# Patient Record
Sex: MALE | Race: WHITE | Employment: OTHER | URBAN - METROPOLITAN AREA
[De-identification: names, ages, dates, MRNs, and addresses within clinical notes are randomized per-mention and may not be internally consistent; named-entity substitution may affect disease eponyms.]

---

## 2017-02-10 ENCOUNTER — TELEPHONE (OUTPATIENT)
Dept: CARDIOLOGY CLINIC | Age: 62
End: 2017-02-10

## 2017-05-23 RX ORDER — METOPROLOL TARTRATE 50 MG/1
TABLET, FILM COATED ORAL
Qty: 180 TABLET | Refills: 0 | Status: SHIPPED | OUTPATIENT
Start: 2017-05-23 | End: 2017-09-15 | Stop reason: SDUPTHER

## 2017-09-15 DIAGNOSIS — E78.2 MIXED HYPERLIPIDEMIA: Primary | Chronic | ICD-10-CM

## 2017-09-15 DIAGNOSIS — I10 ESSENTIAL HYPERTENSION: Chronic | ICD-10-CM

## 2017-09-15 DIAGNOSIS — I25.10 CORONARY ARTERY DISEASE INVOLVING NATIVE HEART WITHOUT ANGINA PECTORIS, UNSPECIFIED VESSEL OR LESION TYPE: Chronic | ICD-10-CM

## 2017-09-15 RX ORDER — ATORVASTATIN CALCIUM 40 MG/1
40 TABLET, FILM COATED ORAL DAILY
Qty: 30 TABLET | Refills: 11 | Status: SHIPPED | OUTPATIENT
Start: 2017-09-15 | End: 2018-07-23 | Stop reason: SDUPTHER

## 2017-09-15 RX ORDER — METOPROLOL TARTRATE 50 MG/1
50 TABLET, FILM COATED ORAL 2 TIMES DAILY
Qty: 180 TABLET | Refills: 0 | Status: SHIPPED | OUTPATIENT
Start: 2017-09-15 | End: 2017-12-13 | Stop reason: SDUPTHER

## 2017-09-15 RX ORDER — AMLODIPINE BESYLATE 5 MG/1
5 TABLET ORAL DAILY
Qty: 90 TABLET | Refills: 3 | Status: SHIPPED | OUTPATIENT
Start: 2017-09-15 | End: 2018-07-23 | Stop reason: SDUPTHER

## 2017-09-15 RX ORDER — FENOFIBRATE 145 MG/1
145 TABLET, COATED ORAL DAILY
Qty: 30 TABLET | Refills: 11 | Status: SHIPPED | OUTPATIENT
Start: 2017-09-15 | End: 2018-07-23 | Stop reason: SDUPTHER

## 2017-11-01 ENCOUNTER — OFFICE VISIT (OUTPATIENT)
Dept: CARDIOLOGY CLINIC | Age: 62
End: 2017-11-01

## 2017-11-01 VITALS
WEIGHT: 193.12 LBS | OXYGEN SATURATION: 96 % | SYSTOLIC BLOOD PRESSURE: 136 MMHG | HEART RATE: 84 BPM | DIASTOLIC BLOOD PRESSURE: 86 MMHG | BODY MASS INDEX: 27.04 KG/M2 | HEIGHT: 71 IN

## 2017-11-01 DIAGNOSIS — E78.2 MIXED HYPERLIPIDEMIA: Chronic | ICD-10-CM

## 2017-11-01 DIAGNOSIS — I71.20 THORACIC AORTIC ANEURYSM WITHOUT RUPTURE: ICD-10-CM

## 2017-11-01 DIAGNOSIS — I10 ESSENTIAL HYPERTENSION: Primary | Chronic | ICD-10-CM

## 2017-11-01 PROCEDURE — 99214 OFFICE O/P EST MOD 30 MIN: CPT | Performed by: INTERNAL MEDICINE

## 2017-11-01 NOTE — COMMUNICATION BODY
[facility]  Cardiac Follow up     Referring Provider:  Mohan Malcolm MD     Chief Complaint   Patient presents with    Coronary Artery Disease    Hyperlipidemia    Hypertension      History of Present Illness:  Mr. Octavia Mtz is here for a follow-up visit on his history of CAD with MI and PCI in 2004, hypertension, and hyperlipidemia. He has not smoked since his heart attack. Today,  Jason Erazo denies any chest pain, palpitations, LO, dizziness, or edema. Recently vacationed in [facility], with grandchildren. He quit taking crestor 5 weeks ago, due to cost. It was $600/3 months. Would like to switch to lipitor. Admits to drinking a 6 pack a day      Past Medical History: has a past medical history of CAD (coronary artery disease); Equilibrium disorder; Hyperlipidemia; Hypertension; and Old MI (myocardial infarction). Surgical History: has a past surgical history that includes Coronary angioplasty with stent and Cardiac catheterization. Social History: reports that he has quit smoking. His smoking use included Cigarettes. He has never used smokeless tobacco. He reports that he drinks alcohol. He reports that he does not use drugs. Family History:family history includes Heart Disease in his brother, brother, father, mother, and paternal uncle. Home Medications:  Outpatient Encounter Prescriptions as of 11/1/2017   Medication Sig Dispense Refill    metoprolol tartrate (LOPRESSOR) 50 MG tablet Take 1 tablet by mouth 2 times daily 180 tablet 0    amLODIPine (NORVASC) 5 MG tablet Take 1 tablet by mouth daily 90 tablet 3    atorvastatin (LIPITOR) 40 MG tablet Take 1 tablet by mouth daily 30 tablet 11    fenofibrate (TRICOR) 145 MG tablet Take 1 tablet by mouth daily 30 tablet 11    aspirin 81 MG EC tablet Take 81 mg by mouth daily. No facility-administered encounter medications on file as of 11/1/2017. Allergies:Review of patient's allergies indicates no known allergies.      Review of 1. CAD: Stable, no anginal symptoms. 2003 cath> s/p Cypher stent to mid LAD.    2004 cath due to positive stress test> patent stent and no other disease noted. 6/2011 Myoview GXT showed normal perfusion with EF 71%. 2. Hyperlipidemia: labs 4/4/16: ,  -patient stopped Crestor. Lipids 5/3/16: ; TRIG 110; HDL 63; LDL 61; AST 23; ALT 23. Restart crestor at 20mg  . 7/16  Triglycerides 669  Will stop crestor and switch to lipitor 40 mg daily due to cost, start tricor 145mg daily  Lipid liver panel in 6 weeks and 3 months   3. Hypertension: Blood pressure 136/86, pulse 84, height 5' 11\" (1.803 m), weight 193 lb 1.9 oz (87.6 kg), SpO2 96 %. controlled         Plan:  Stable cardiac status  1. Change crestor to lipitor 40 mg daily  2. Lipid liver panel in 6 weeks and 3 months  3. Follow up in 4 months  4.   Recommend decrease beer intake to no more than 2 /day    Thank you for allowing me to participate in the care of this individual.    Arlin Mayfield M.D., [facility]

## 2017-11-01 NOTE — LETTER
 atorvastatin (LIPITOR) 40 MG tablet Take 1 tablet by mouth daily 30 tablet 11    fenofibrate (TRICOR) 145 MG tablet Take 1 tablet by mouth daily 30 tablet 11    aspirin 81 MG EC tablet Take 81 mg by mouth daily. No facility-administered encounter medications on file as of 11/1/2017. Allergies:Review of patient's allergies indicates no known allergies. Review of Systems:   · Constitutional: there has been no unanticipated weight loss. There's been no change in energy level, sleep pattern, or activity level. · Eyes: No visual changes or diplopia. No scleral icterus. · ENT: No Headaches, hearing loss or vertigo. No mouth sores or sore throat. · Cardiovascular: Reviewed in HPI  · Respiratory: No cough or wheezing, no sputum production. No hematemesis. · Gastrointestinal: No abdominal pain, appetite loss, blood in stools. No change in bowel or bladder habits. · Genitourinary: No dysuria, trouble voiding, or hematuria. · Musculoskeletal:  No gait disturbance, weakness or joint complaints. · Integumentary: No rash or pruritis. · Neurological: No headache, diplopia, change in muscle strength, numbness or tingling. No change in gait, balance, coordination, mood, affect, memory, mentation, behavior. · Psychiatric: No anxiety, no depression. · Endocrine: No malaise, fatigue or temperature intolerance. No excessive thirst, fluid intake, or urination. No tremor. · Hematologic/Lymphatic: No abnormal bruising or bleeding, blood clots or swollen lymph nodes. · Allergic/Immunologic: No nasal congestion or hives.     Physical Examination:    Vitals:    11/01/17 1459   BP: 136/86   Pulse: 84   SpO2: 96%     GENERAL: Well developed, well nourished, No acute distress  · NEUROLOGICAL: Alert and oriented  · PSYCH: Calm affect  · SKIN: Warm and dry  · HEENT: Normocephalic, Sclera non-icteric, mucus membranes moist  · NECK: supple, JVP normal  · CAROTID: Normal upstroke, no bruits · CARDIAC: Normal PMI, regular rate and rhythm, normal S1S2, no murmur, rub, or gallop  · RESPIRATORY: Normal respiratory effort, clear to auscultation bilaterally  · EXTREMITIES: No edema  · MUSCULOSKELETAL: No joint swelling or tenderness, no chest wall tenderness  · GASTROINTESTINAL: normal bowel sounds, soft, non-tender, no bruit    US of GB--  1. HEPATIC STEATOSIS  2. GALLBLADDER SLUDGE  Assessment:   1. CAD: Stable, no anginal symptoms. 2003 cath> s/p Cypher stent to mid LAD.    2004 cath due to positive stress test> patent stent and no other disease noted. 6/2011 Myoview GXT showed normal perfusion with EF 71%. 2. Hyperlipidemia: labs 4/4/16: ,  -patient stopped Crestor. Lipids 5/3/16: ; TRIG 110; HDL 63; LDL 61; AST 23; ALT 23. Restart crestor at 20mg  . 7/16  Triglycerides 669  Will stop crestor and switch to lipitor 40 mg daily due to cost, start tricor 145mg daily  Lipid liver panel in 6 weeks and 3 months   3. Hypertension: Blood pressure 136/86, pulse 84, height 5' 11\" (1.803 m), weight 193 lb 1.9 oz (87.6 kg), SpO2 96 %. controlled         Plan:  Stable cardiac status  1. Change crestor to lipitor 40 mg daily  2. Lipid liver panel in 6 weeks and 3 months  3. Follow up in 4 months  4. Recommend decrease beer intake to no more than 2 /day    Thank you for allowing me to participate in the care of this individual.    Chiquita Rojo M.D., [facility] [place]          If you have questions, please do not hesitate to call me. I look forward to following Eusebia Leyva along with you.     Sincerely,        Pastor Cathryn MD

## 2017-11-01 NOTE — PATIENT INSTRUCTIONS
Same medications  Fasting labs soon bmp, liver and lipid. Stay active. He walks about 10 miles at work daily. Follow up in 1 year.

## 2017-11-05 LAB
A/G RATIO: 1.6 (CALC) (ref 1–2.5)
ALBUMIN SERPL-MCNC: 4.5 G/DL (ref 3.6–5.1)
ALP BLD-CCNC: 51 U/L (ref 40–115)
ALT SERPL-CCNC: 25 U/L (ref 9–46)
AST SERPL-CCNC: 44 U/L (ref 10–35)
ATYPICAL LYMPHOCYTE RELATIVE PERCENT: ABNORMAL % (ref 0–10)
BAND NEUTROPHILS: ABNORMAL %
BANDED NEUTROPHILS ABSOLUTE COUNT: ABNORMAL CELLS/UL (ref 0–750)
BASOPHILS ABSOLUTE: 53 CELLS/UL (ref 0–200)
BASOPHILS RELATIVE PERCENT: 0.7 %
BILIRUB SERPL-MCNC: 0.9 MG/DL (ref 0.2–1.2)
BILIRUBIN DIRECT: 0.2 MG/DL
BILIRUBIN, INDIRECT: 0.7 MG/DL (CALC) (ref 0.2–1.2)
BLASTS ABSOLUTE COUNT: ABNORMAL CELLS/UL
BLASTS RELATIVE PERCENT: ABNORMAL %
BUN / CREAT RATIO: ABNORMAL (CALC) (ref 6–22)
BUN BLDV-MCNC: 15 MG/DL (ref 7–25)
CALCIUM SERPL-MCNC: 9.6 MG/DL (ref 8.6–10.3)
CHLORIDE BLD-SCNC: 103 MMOL/L (ref 98–110)
CHOLESTEROL, TOTAL: 162 MG/DL
CHOLESTEROL/HDL RATIO: 2.3 (CALC)
CHOLESTEROL: 91 MG/DL (CALC)
CO2: 24 MMOL/L (ref 20–31)
COMMENT: ABNORMAL
CREAT SERPL-MCNC: 1 MG/DL (ref 0.7–1.25)
EOSINOPHILS ABSOLUTE: 114 CELLS/UL (ref 15–500)
EOSINOPHILS RELATIVE PERCENT: 1.5 %
GFR AFRICAN AMERICAN: 92 ML/MIN/1.73M2
GFR SERPL CREATININE-BSD FRML MDRD: 79 ML/MIN/1.73M2
GLOBULIN: 2.8 G/DL (CALC) (ref 1.9–3.7)
GLUCOSE BLD-MCNC: 102 MG/DL (ref 65–99)
HCT VFR BLD CALC: 46.3 % (ref 38.5–50)
HDLC SERPL-MCNC: 71 MG/DL
HEMOGLOBIN: 16.1 G/DL (ref 13.2–17.1)
LDL CHOLESTEROL CALCULATED: 68 MG/DL (CALC)
LYMPHOCYTES ABSOLUTE: 1148 CELLS/UL (ref 850–3900)
LYMPHOCYTES RELATIVE PERCENT: 15.1 %
MCH RBC QN AUTO: 33.8 PG (ref 27–33)
MCHC RBC AUTO-ENTMCNC: 34.8 G/DL (ref 32–36)
MCV RBC AUTO: 97.3 FL (ref 80–100)
METAMYELOCYTES ABSOLUTE COUNT: ABNORMAL CELLS/UL
METAMYELOCYTES RELATIVE PERCENT: ABNORMAL %
MONOCYTES ABSOLUTE: 722 CELLS/UL (ref 200–950)
MONOCYTES RELATIVE PERCENT: 9.5 %
MYELOCYTE PERCENT: ABNORMAL %
MYELOCYTES ABSOLUTE COUNT: ABNORMAL CELLS/UL
NEUTROPHILS ABSOLUTE: 5563 CELLS/UL (ref 1500–7800)
NUCLEATED RED BLOOD CELLS: ABNORMAL /100 WBC
NUCLEATED RED BLOOD CELLS: ABNORMAL CELLS/UL
PDW BLD-RTO: 12.7 % (ref 11–15)
PLATELET # BLD: 264 THOUSAND/UL (ref 140–400)
PMV BLD AUTO: 10.5 FL (ref 7.5–12.5)
POTASSIUM SERPL-SCNC: 4.4 MMOL/L (ref 3.5–5.3)
PROMYELOCYTES ABSOLUTE COUNT: ABNORMAL CELLS/UL
PROMYELOCYTES PERCENT: ABNORMAL %
RBC # BLD: 4.76 MILLION/UL (ref 4.2–5.8)
SEGMENTED NEUTROPHILS RELATIVE PERCENT: 73.2 %
SODIUM BLD-SCNC: 140 MMOL/L (ref 135–146)
TOTAL PROTEIN: 7.3 G/DL (ref 6.1–8.1)
TRIGL SERPL-MCNC: 142 MG/DL
WBC # BLD: 7.6 THOUSAND/UL (ref 3.8–10.8)

## 2017-11-06 ENCOUNTER — TELEPHONE (OUTPATIENT)
Dept: CARDIOLOGY CLINIC | Age: 62
End: 2017-11-06

## 2017-11-06 NOTE — TELEPHONE ENCOUNTER
Notes Recorded by Tyrese Hutchison RN on 11/6/2017 at 9:40 AM EST (CBC, Lipids, Hepatic, BMP)  Notify pt labs ok.  Follow up as scheduled        Patient informed

## 2017-12-13 DIAGNOSIS — E78.2 MIXED HYPERLIPIDEMIA: Chronic | ICD-10-CM

## 2017-12-13 DIAGNOSIS — I10 ESSENTIAL HYPERTENSION: Chronic | ICD-10-CM

## 2017-12-13 DIAGNOSIS — I25.10 CORONARY ARTERY DISEASE INVOLVING NATIVE HEART WITHOUT ANGINA PECTORIS, UNSPECIFIED VESSEL OR LESION TYPE: Chronic | ICD-10-CM

## 2017-12-14 RX ORDER — METOPROLOL TARTRATE 50 MG/1
TABLET, FILM COATED ORAL
Qty: 180 TABLET | Refills: 0 | Status: SHIPPED | OUTPATIENT
Start: 2017-12-14 | End: 2018-03-05 | Stop reason: SDUPTHER

## 2018-03-05 DIAGNOSIS — I10 ESSENTIAL HYPERTENSION: Chronic | ICD-10-CM

## 2018-03-05 DIAGNOSIS — I25.10 CORONARY ARTERY DISEASE INVOLVING NATIVE HEART WITHOUT ANGINA PECTORIS, UNSPECIFIED VESSEL OR LESION TYPE: Chronic | ICD-10-CM

## 2018-03-05 DIAGNOSIS — E78.2 MIXED HYPERLIPIDEMIA: Chronic | ICD-10-CM

## 2018-03-05 NOTE — TELEPHONE ENCOUNTER
Medication Refill    When was your last appointment with cardiology? Scheduled 4/27/18 [facility]  Last in on 11/1/17  (if 1year or longer, please schedule an appointment)    Medication needing refilled:metoprolol tartrate (LOPRESSOR) 50 MG tablet    Doseage of the medication: 50 mg    How are you taking this medication (QD, BID, TID, QID, PRN):TAKE 1 TABLET BY MOUTH TWO TIMES A DAY    Patient want a 30 or 90 day supply called in:90    Which Pharmacy are we sending the medication to: [address], [address] [facility]    Pharmacy Phone number:    Pharmacy Fax number:

## 2018-03-06 RX ORDER — METOPROLOL TARTRATE 50 MG/1
TABLET, FILM COATED ORAL
Qty: 180 TABLET | Refills: 1 | Status: SHIPPED | OUTPATIENT
Start: 2018-03-06 | End: 2018-07-23 | Stop reason: SDUPTHER

## 2018-07-23 ENCOUNTER — OFFICE VISIT (OUTPATIENT)
Dept: CARDIOLOGY CLINIC | Age: 63
End: 2018-07-23

## 2018-07-23 VITALS
DIASTOLIC BLOOD PRESSURE: 72 MMHG | HEART RATE: 76 BPM | BODY MASS INDEX: 25.9 KG/M2 | SYSTOLIC BLOOD PRESSURE: 126 MMHG | WEIGHT: 185 LBS | HEIGHT: 71 IN

## 2018-07-23 DIAGNOSIS — I10 ESSENTIAL HYPERTENSION: Chronic | ICD-10-CM

## 2018-07-23 DIAGNOSIS — I25.10 CORONARY ARTERY DISEASE INVOLVING NATIVE HEART WITHOUT ANGINA PECTORIS, UNSPECIFIED VESSEL OR LESION TYPE: Primary | Chronic | ICD-10-CM

## 2018-07-23 DIAGNOSIS — E78.2 MIXED HYPERLIPIDEMIA: Chronic | ICD-10-CM

## 2018-07-23 PROCEDURE — 99214 OFFICE O/P EST MOD 30 MIN: CPT | Performed by: INTERNAL MEDICINE

## 2018-07-23 RX ORDER — ATORVASTATIN CALCIUM 40 MG/1
40 TABLET, FILM COATED ORAL DAILY
Qty: 30 TABLET | Refills: 11 | Status: SHIPPED | OUTPATIENT
Start: 2018-07-23 | End: 2019-07-29 | Stop reason: SDUPTHER

## 2018-07-23 RX ORDER — FENOFIBRATE 145 MG/1
145 TABLET, COATED ORAL DAILY
Qty: 30 TABLET | Refills: 11 | Status: SHIPPED | OUTPATIENT
Start: 2018-07-23 | End: 2019-07-29 | Stop reason: SDUPTHER

## 2018-07-23 RX ORDER — METOPROLOL TARTRATE 50 MG/1
TABLET, FILM COATED ORAL
Qty: 60 TABLET | Refills: 11 | Status: SHIPPED | OUTPATIENT
Start: 2018-07-23 | End: 2019-07-29 | Stop reason: SDUPTHER

## 2018-07-23 RX ORDER — AMLODIPINE BESYLATE 5 MG/1
5 TABLET ORAL DAILY
Qty: 30 TABLET | Refills: 11 | Status: SHIPPED | OUTPATIENT
Start: 2018-07-23 | End: 2019-07-29 | Stop reason: SDUPTHER

## 2018-07-23 NOTE — LETTER
atorvastatin (LIPITOR) 40 MG tablet Take 1 tablet by mouth daily Yes Otf Gaming MD   fenofibrate (TRICOR) 145 MG tablet Take 1 tablet by mouth daily Yes Otf Gaming MD   aspirin 81 MG EC tablet Take 81 mg by mouth daily. Yes Historical Provider, MD       Allergies:Patient has no known allergies. [x] Medications and dosages reviewed. ROS:  [x]Full ROS obtained and negative except as mentioned in HPI      Physical Examination:    Vitals:    07/23/18 0749   BP: 126/72   Pulse: 76   /72 (Site: Left Arm, Position: Sitting, Cuff Size: Medium Adult)   Pulse 76   Ht 5' 11\" (1.803 m)   Wt 185 lb (83.9 kg)   BMI 25.80 kg/m²       GENERAL: Well developed, well nourished, No acute distress  · NEUROLOGICAL: Alert and oriented  · PSYCH: Calm affect  · SKIN: Warm and dry  · HEENT: Normocephalic, Sclera non-icteric, mucus membranes moist  · NECK: supple, JVP normal  · CAROTID: Normal upstroke, no bruits  · CARDIAC: Normal PMI, regular rate and rhythm, normal S1S2, no murmur, rub, or gallop  · RESPIRATORY: Normal respiratory effort, clear to auscultation bilaterally  · EXTREMITIES: No edema  · MUSCULOSKELETAL: No joint swelling or tenderness, no chest wall tenderness  · GASTROINTESTINAL: normal bowel sounds, soft, non-tender, no bruit      Assessment:   1. CAD:   Remains active and stable without angina. 2003 cath> s/p Cypher stent to mid LAD.    2004 cath due to positive stress test> patent stent and no other disease noted. 6/2011 Myoview GXT showed normal perfusion with EF 71%. 2. Hyperlipidemia: LDL=68, 11/2017-controlled on lipitor   3. Hypertension: Blood pressure 126/72, pulse 76, height 5' 11\" (1.803 m), weight 185 lb (83.9 kg). Well controlled       Plan:  Stable cardiac status  Recheck lipids  F/u 1 year    Thank you for allowing me to participate in the care of this individual.    Makayla Adan M.D., [facility] [place]           If you have questions, please do not hesitate to call me.  I look forward to following Lenon Cheese along with you.     Sincerely,        Bonifacio Keith MD

## 2018-07-23 NOTE — PROGRESS NOTES
Vitals:    07/23/18 0749   BP: 126/72   Pulse: 76   /72 (Site: Left Arm, Position: Sitting, Cuff Size: Medium Adult)   Pulse 76   Ht 5' 11\" (1.803 m)   Wt 185 lb (83.9 kg)   BMI 25.80 kg/m²       GENERAL: Well developed, well nourished, No acute distress  · NEUROLOGICAL: Alert and oriented  · PSYCH: Calm affect  · SKIN: Warm and dry  · HEENT: Normocephalic, Sclera non-icteric, mucus membranes moist  · NECK: supple, JVP normal  · CAROTID: Normal upstroke, no bruits  · CARDIAC: Normal PMI, regular rate and rhythm, normal S1S2, no murmur, rub, or gallop  · RESPIRATORY: Normal respiratory effort, clear to auscultation bilaterally  · EXTREMITIES: No edema  · MUSCULOSKELETAL: No joint swelling or tenderness, no chest wall tenderness  · GASTROINTESTINAL: normal bowel sounds, soft, non-tender, no bruit      Assessment:   1. CAD:   Remains active and stable without angina. 2003 cath> s/p Cypher stent to mid LAD.    2004 cath due to positive stress test> patent stent and no other disease noted. 6/2011 Myoview GXT showed normal perfusion with EF 71%. 2. Hyperlipidemia: LDL=68, 11/2017-controlled on lipitor   3. Hypertension: Blood pressure 126/72, pulse 76, height 5' 11\" (1.803 m), weight 185 lb (83.9 kg).  Well controlled       Plan:  Stable cardiac status  Recheck lipids  F/u 1 year    Thank you for allowing me to participate in the care of this individual.    Dora Harvey M.D., [facility]

## 2018-07-24 NOTE — COMMUNICATION BODY
Vitals:    07/23/18 0749   BP: 126/72   Pulse: 76   /72 (Site: Left Arm, Position: Sitting, Cuff Size: Medium Adult)   Pulse 76   Ht 5' 11\" (1.803 m)   Wt 185 lb (83.9 kg)   BMI 25.80 kg/m²       GENERAL: Well developed, well nourished, No acute distress  · NEUROLOGICAL: Alert and oriented  · PSYCH: Calm affect  · SKIN: Warm and dry  · HEENT: Normocephalic, Sclera non-icteric, mucus membranes moist  · NECK: supple, JVP normal  · CAROTID: Normal upstroke, no bruits  · CARDIAC: Normal PMI, regular rate and rhythm, normal S1S2, no murmur, rub, or gallop  · RESPIRATORY: Normal respiratory effort, clear to auscultation bilaterally  · EXTREMITIES: No edema  · MUSCULOSKELETAL: No joint swelling or tenderness, no chest wall tenderness  · GASTROINTESTINAL: normal bowel sounds, soft, non-tender, no bruit      Assessment:   1. CAD:   Remains active and stable without angina. 2003 cath> s/p Cypher stent to mid LAD.    2004 cath due to positive stress test> patent stent and no other disease noted. 6/2011 Myoview GXT showed normal perfusion with EF 71%. 2. Hyperlipidemia: LDL=68, 11/2017-controlled on lipitor   3. Hypertension: Blood pressure 126/72, pulse 76, height 5' 11\" (1.803 m), weight 185 lb (83.9 kg).  Well controlled       Plan:  Stable cardiac status  Recheck lipids  F/u 1 year    Thank you for allowing me to participate in the care of this individual.    Annika Myers M.D., [facility]

## 2019-07-29 ENCOUNTER — OFFICE VISIT (OUTPATIENT)
Dept: CARDIOLOGY CLINIC | Age: 64
End: 2019-07-29
Payer: MEDICARE

## 2019-07-29 VITALS
SYSTOLIC BLOOD PRESSURE: 130 MMHG | WEIGHT: 187.8 LBS | DIASTOLIC BLOOD PRESSURE: 76 MMHG | BODY MASS INDEX: 25.44 KG/M2 | HEIGHT: 72 IN | HEART RATE: 64 BPM

## 2019-07-29 DIAGNOSIS — I25.10 CORONARY ARTERY DISEASE INVOLVING NATIVE HEART WITHOUT ANGINA PECTORIS, UNSPECIFIED VESSEL OR LESION TYPE: Primary | ICD-10-CM

## 2019-07-29 DIAGNOSIS — E78.2 MIXED HYPERLIPIDEMIA: ICD-10-CM

## 2019-07-29 DIAGNOSIS — I10 ESSENTIAL HYPERTENSION: ICD-10-CM

## 2019-07-29 PROCEDURE — 99214 OFFICE O/P EST MOD 30 MIN: CPT | Performed by: INTERNAL MEDICINE

## 2019-07-29 RX ORDER — AMLODIPINE BESYLATE 5 MG/1
5 TABLET ORAL DAILY
Qty: 30 TABLET | Refills: 11 | Status: SHIPPED | OUTPATIENT
Start: 2019-07-29 | End: 2020-07-29

## 2019-07-29 RX ORDER — METOPROLOL TARTRATE 50 MG/1
TABLET, FILM COATED ORAL
Qty: 60 TABLET | Refills: 11 | Status: SHIPPED | OUTPATIENT
Start: 2019-07-29 | End: 2020-07-29

## 2019-07-29 RX ORDER — FENOFIBRATE 145 MG/1
145 TABLET, COATED ORAL DAILY
Qty: 30 TABLET | Refills: 11 | Status: SHIPPED | OUTPATIENT
Start: 2019-07-29 | End: 2020-07-29

## 2019-07-29 RX ORDER — CITALOPRAM 10 MG/1
10 TABLET ORAL DAILY
COMMUNITY
Start: 2019-05-10 | End: 2021-11-02

## 2019-07-29 RX ORDER — ATORVASTATIN CALCIUM 40 MG/1
40 TABLET, FILM COATED ORAL DAILY
Qty: 30 TABLET | Refills: 11 | Status: SHIPPED | OUTPATIENT
Start: 2019-07-29 | End: 2020-07-29

## 2019-07-29 NOTE — LETTER
[address]  [address]  Phone: [phone]  Fax: [phone]    Jose Fuentes MD        July 29, 2019     [facility] [id number]  [place] [address]    Patient: Petra Raphael  MR Number: [id number]  YOB: 1955  Date of Visit: 7/29/2019    Dear Dr. Jeanine Patiño:    Below are the relevant portions of my assessment and plan of care. [address]  Cardiac Follow up     Referring Provider:  Alexus Zavaleta MD     Chief Complaint   Patient presents with    1 Year Follow Up     No cardiac comp    Coronary Artery Disease    Hypertension    Hyperlipidemia      History of Present Illness:  Mr. Anuradha Aranda is here for a follow-up visit on his history of CAD with MI and PCI in 2004, hypertension, and hyperlipidemia. He has not smoked since his heart attack. He is doing well. Remains active. No chest pain, tightness or pressure. No side effects from meds. [place]. Past Medical History: has a past medical history of CAD (coronary artery disease), Equilibrium disorder, Hyperlipidemia, Hypertension, and Old MI (myocardial infarction). Surgical History: has a past surgical history that includes Coronary angioplasty with stent and Cardiac catheterization. Social History: reports that he has quit smoking. His smoking use included cigarettes. He has never used smokeless tobacco. He reports that he drinks alcohol. He reports that he does not use drugs. Family History:family history includes Heart Disease in his brother, brother, father, mother, and paternal uncle. Home Medications:  Prior to Visit Medications    Medication Sig Taking?  Authorizing Provider   citalopram (CELEXA) 10 MG tablet Take 10 mg by mouth daily  Yes Historical Provider, MD   metoprolol tartrate (LOPRESSOR) 50 MG tablet TAKE 1 TABLET BY MOUTH TWO TIMES A DAY Yes Jose Fuentes MD amLODIPine (NORVASC) 5 MG tablet Take 1 tablet by mouth daily Yes Abbi Cruz MD   atorvastatin (LIPITOR) 40 MG tablet Take 1 tablet by mouth daily Yes Abbi Cruz MD   fenofibrate (TRICOR) 145 MG tablet Take 1 tablet by mouth daily Yes Abbi Cruz MD   aspirin 81 MG EC tablet Take 81 mg by mouth daily. Yes Historical Provider, MD       Allergies:Patient has no known allergies. [x] Medications and dosages reviewed. ROS:  [x]Full ROS obtained and negative except as mentioned in HPI      Physical Examination:    Vitals:    07/29/19 0915   BP: 130/76   Pulse: 64   /76   Pulse 64   Ht 5' 11.5\" (1.816 m)   Wt 187 lb 12.8 oz (85.2 kg)   BMI 25.83 kg/m²       GENERAL: Well developed, well nourished, No acute distress  · NEUROLOGICAL: Alert and oriented  · PSYCH: Calm affect  · SKIN: Warm and dry, no rash  · HEENT: Normocephalic, Sclera non-icteric, mucus membranes moist  · NECK: supple, JVP normal  · CAROTID: Normal upstroke, no bruits  · CARDIAC: Normal PMI, regular rate and rhythm, normal S1S2, no murmur, rub, or gallop  · RESPIRATORY: Normal respiratory effort, clear to auscultation bilaterally  · EXTREMITIES: No LE edema  · MUSCULOSKELETAL: No joint swelling or tenderness, no chest wall tenderness  · GASTROINTESTINAL: normal bowel sounds, soft, non-tender, no bruit      Assessment:   1. CAD:   Remains active and stable without angina. Continue medical management  2003 cath> s/p Cypher stent to mid LAD.    2004 cath due to positive stress test> patent stent and no other disease noted. 6/2011 Myoview GXT showed normal perfusion with EF 71%. 2. Hyperlipidemia: LDL=68, 11/2017-controlled on lipitor in past. Needs labs. Planning on having Saturday   3. Hypertension: Blood pressure 130/76, pulse 64, height 5' 11.5\" (1.816 m), weight 187 lb 12.8 oz (85.2 kg). Well controlled.  Same meds       Plan:  Stable cardiac status  Labs Saturday  F/u 1 year  Recheck lipids  F/u 1 year

## 2020-07-29 RX ORDER — ATORVASTATIN CALCIUM 40 MG/1
TABLET, FILM COATED ORAL
Qty: 30 TABLET | Refills: 0 | Status: SHIPPED | OUTPATIENT
Start: 2020-07-29 | End: 2020-08-31

## 2020-07-29 RX ORDER — FENOFIBRATE 145 MG/1
TABLET, COATED ORAL
Qty: 30 TABLET | Refills: 0 | Status: SHIPPED | OUTPATIENT
Start: 2020-07-29 | End: 2020-08-31

## 2020-07-29 RX ORDER — AMLODIPINE BESYLATE 5 MG/1
TABLET ORAL
Qty: 30 TABLET | Refills: 0 | Status: SHIPPED | OUTPATIENT
Start: 2020-07-29 | End: 2020-08-31

## 2020-07-29 RX ORDER — METOPROLOL TARTRATE 50 MG/1
TABLET, FILM COATED ORAL
Qty: 60 TABLET | Refills: 0 | Status: SHIPPED | OUTPATIENT
Start: 2020-07-29 | End: 2020-08-31

## 2020-07-29 NOTE — TELEPHONE ENCOUNTER
RX APPROVAL:      Refill:   Requested Prescriptions     Pending Prescriptions Disp Refills    fenofibrate (TRICOR) 145 MG tablet [Pharmacy Med Name: Fenofibrate Oral Tablet 145 MG] 30 tablet 0     Sig: TAKE 1 TABLET BY MOUTH ONE TIME A DAY    atorvastatin (LIPITOR) 40 MG tablet [Pharmacy Med Name: Atorvastatin Calcium Oral Tablet 40 MG] 30 tablet 0     Sig: TAKE 1 TABLET BY MOUTH ONE TIME A DAY    metoprolol tartrate (LOPRESSOR) 50 MG tablet [Pharmacy Med Name: Metoprolol Tartrate Oral Tablet 50 MG] 60 tablet 0     Sig: TAKE 1 TABLET BY MOUTH TWO TIMES A DAY    amLODIPine (NORVASC) 5 MG tablet [Pharmacy Med Name: amLODIPine Besylate Oral Tablet 5 MG] 30 tablet 0     Sig: TAKE 1 TABLET BY MOUTH ONE TIME A DAY      Last OV: 7/29/2019 next ov 8/4/20  Last EKG:    Last Labs:  Lab Results   Component Value Date    CHOL 162 11/04/2017    CHOL 91 11/04/2017    TRIG 142 11/04/2017    HDL 71 11/04/2017    LDLCALC 68 11/04/2017     Lab Results   Component Value Date    ALT 25 11/04/2017    AST 44 11/04/2017       Plan and labs reviewed

## 2020-08-04 ENCOUNTER — HOSPITAL ENCOUNTER (OUTPATIENT)
Age: 65
Discharge: HOME | End: 2020-08-04
Payer: MEDICARE

## 2020-08-04 ENCOUNTER — OFFICE VISIT (OUTPATIENT)
Dept: CARDIOLOGY CLINIC | Age: 65
End: 2020-08-04
Payer: MEDICARE

## 2020-08-04 VITALS
WEIGHT: 218.2 LBS | HEART RATE: 72 BPM | HEIGHT: 72 IN | SYSTOLIC BLOOD PRESSURE: 138 MMHG | DIASTOLIC BLOOD PRESSURE: 80 MMHG | BODY MASS INDEX: 29.55 KG/M2

## 2020-08-04 LAB
A/G RATIO: 1.4 (ref 1.1–2.2)
ALBUMIN SERPL-MCNC: 4.3 G/DL (ref 3.4–5)
ALP BLD-CCNC: 45 U/L (ref 40–129)
ALT SERPL-CCNC: 32 U/L (ref 10–40)
ANION GAP SERPL CALCULATED.3IONS-SCNC: 13 MMOL/L (ref 3–16)
AST SERPL-CCNC: 33 U/L (ref 15–37)
BASOPHILS ABSOLUTE: 0.3 K/UL (ref 0–0.2)
BASOPHILS RELATIVE PERCENT: 3.8 %
BILIRUB SERPL-MCNC: 0.5 MG/DL (ref 0–1)
BUN BLDV-MCNC: 8 MG/DL (ref 7–20)
CALCIUM SERPL-MCNC: 9.7 MG/DL (ref 8.3–10.6)
CHLORIDE BLD-SCNC: 102 MMOL/L (ref 99–110)
CHOLESTEROL, TOTAL: 171 MG/DL (ref 0–199)
CO2: 26 MMOL/L (ref 21–32)
CREAT SERPL-MCNC: 0.7 MG/DL (ref 0.8–1.3)
EOSINOPHILS ABSOLUTE: 0.2 K/UL (ref 0–0.6)
EOSINOPHILS RELATIVE PERCENT: 2.8 %
GFR AFRICAN AMERICAN: >60
GFR NON-AFRICAN AMERICAN: >60
GLOBULIN: 3 G/DL
GLUCOSE BLD-MCNC: 114 MG/DL (ref 70–99)
HCT VFR BLD CALC: 48.7 % (ref 40.5–52.5)
HDLC SERPL-MCNC: 45 MG/DL (ref 40–60)
HEMOGLOBIN: 16.3 G/DL (ref 13.5–17.5)
LDL CHOLESTEROL CALCULATED: 100 MG/DL
LYMPHOCYTES ABSOLUTE: 1.4 K/UL (ref 1–5.1)
LYMPHOCYTES RELATIVE PERCENT: 19.3 %
MCH RBC QN AUTO: 34.1 PG (ref 26–34)
MCHC RBC AUTO-ENTMCNC: 33.5 G/DL (ref 31–36)
MCV RBC AUTO: 101.7 FL (ref 80–100)
MONOCYTES ABSOLUTE: 0.6 K/UL (ref 0–1.3)
MONOCYTES RELATIVE PERCENT: 7.7 %
NEUTROPHILS ABSOLUTE: 4.9 K/UL (ref 1.7–7.7)
NEUTROPHILS RELATIVE PERCENT: 66.4 %
PDW BLD-RTO: 14.2 % (ref 12.4–15.4)
PLATELET # BLD: 352 K/UL (ref 135–450)
PMV BLD AUTO: 9.1 FL (ref 5–10.5)
POTASSIUM SERPL-SCNC: 4.4 MMOL/L (ref 3.5–5.1)
RBC # BLD: 4.79 M/UL (ref 4.2–5.9)
SODIUM BLD-SCNC: 141 MMOL/L (ref 136–145)
TOTAL PROTEIN: 7.3 G/DL (ref 6.4–8.2)
TRIGL SERPL-MCNC: 130 MG/DL (ref 0–150)
VLDLC SERPL CALC-MCNC: 26 MG/DL
WBC # BLD: 7.5 K/UL (ref 4–11)

## 2020-08-04 PROCEDURE — 85025 COMPLETE CBC W/AUTO DIFF WBC: CPT

## 2020-08-04 PROCEDURE — 80053 COMPREHEN METABOLIC PANEL: CPT

## 2020-08-04 PROCEDURE — 80061 LIPID PANEL: CPT

## 2020-08-04 PROCEDURE — 36415 COLL VENOUS BLD VENIPUNCTURE: CPT

## 2020-08-04 PROCEDURE — 99214 OFFICE O/P EST MOD 30 MIN: CPT | Performed by: INTERNAL MEDICINE

## 2020-08-04 NOTE — PROGRESS NOTES
[address]  Cardiac Follow up     Referring Provider:  No primary care provider on file. Chief Complaint   Patient presents with    Coronary Artery Disease    Hypertension      History of Present Illness:  Mr. Jeffery Millard is here for a follow-up visit on his history of CAD with MI and PCI in 2004, hypertension, and hyperlipidemia. He has not smoked since his heart attack. He is doing well. Issues with alcohol issue a year ago. Drinking less at this time. Remains active. No chest pain or dyspnea with exertion. Tolerating medications without side effects. BP controlled. Past Medical History: has a past medical history of CAD (coronary artery disease), Equilibrium disorder, Hyperlipidemia, Hypertension, and Old MI (myocardial infarction). Surgical History: has a past surgical history that includes Coronary angioplasty with stent and Cardiac catheterization. Social History: reports that he has quit smoking. His smoking use included cigarettes. He has never used smokeless tobacco. He reports current alcohol use. He reports that he does not use drugs. Family History:family history includes Heart Disease in his brother, brother, father, mother, and paternal uncle. Home Medications:  Prior to Visit Medications    Medication Sig Taking? Authorizing Provider   fenofibrate (TRICOR) 145 MG tablet TAKE 1 TABLET BY MOUTH ONE TIME A DAY  Yes Roel Daugherty MD   atorvastatin (LIPITOR) 40 MG tablet TAKE 1 TABLET BY MOUTH ONE TIME A DAY  Yes Roel Daugherty MD   metoprolol tartrate (LOPRESSOR) 50 MG tablet TAKE 1 TABLET BY MOUTH TWO TIMES A DAY  Yes Roel Daugherty MD   amLODIPine (NORVASC) 5 MG tablet TAKE 1 TABLET BY MOUTH ONE TIME A DAY  Yes Roel Daugherty MD   citalopram (CELEXA) 10 MG tablet Take 10 mg by mouth daily  Yes Historical Provider, MD   aspirin 81 MG EC tablet Take 81 mg by mouth daily. Yes Historical Provider, MD       Allergies:Patient has no known allergies.      [x] Medications and dosages reviewed. ROS:  [x]Full ROS obtained and negative except as mentioned in HPI      Physical Examination:    Vitals:    08/04/20 0724   BP: 138/80   Pulse: 72   /80 (Site: Right Upper Arm, Position: Sitting, Cuff Size: Medium Adult)   Pulse 72   Ht 6' (1.829 m)   Wt 218 lb 3.2 oz (99 kg)   BMI 29.59 kg/m²            GENERAL: Well developed, well nourished, No acute distress  · NEUROLOGICAL: Alert and oriented  · PSYCH: Calm affect  · SKIN: Warm and dry, no rash  · HEENT: Normocephalic, Sclera non-icteric, mucus membranes moist  · NECK: supple, JVP normal  · CAROTID: Normal upstroke, no bruits  · CARDIAC: Normal PMI, regular rate and rhythm, normal S1S2, No murmur, rub, or gallop  · RESPIRATORY: Normal respiratory effort, Clear to auscultation bilaterally  · EXTREMITIES: No LE edema  · MUSCULOSKELETAL: No joint swelling or tenderness, no chest wall tenderness  · GASTROINTESTINAL: normal bowel sounds, soft, non-tender, no bruit      Assessment:   1. CAD:   Stable without angina. Continue current medical management. Consider stress next year  2003 cath> s/p Cypher stent to mid LAD.    2004 cath due to positive stress test> patent stent and no other disease noted. 6/2011 Myoview GXT showed normal perfusion with EF 71%. 2. Hyperlipidemia: LDL=68, 11/2017-controlled on lipitor in past. Needs labs. Planning on having Saturday   3. Hypertension: Blood pressure 138/80, pulse 72, height 6' (1.829 m), weight 218 lb 3.2 oz (99 kg). Well controlled on current medications.  Continue current therapy       Plan:  Stable cardiac status  Continue current medications  Labs today  F/u 1 yr  Needs PCP    Thank you for allowing me to participate in the care of this individual.    Sawyer Che M.D., [facility] [place]

## 2020-08-07 ENCOUNTER — TELEPHONE (OUTPATIENT)
Dept: CARDIOLOGY CLINIC | Age: 65
End: 2020-08-07

## 2020-08-28 NOTE — TELEPHONE ENCOUNTER
Received refill request for amlodipine 5 mg, metoprolol 50 mg , atorvastatin 40 mg ,fenofibrate 145 mg  from [facility].     Last OV: 8/4/20    Last Labs: 8/10/20    Last Refill: 7/29/20    Next Appt: none

## 2020-08-31 RX ORDER — AMLODIPINE BESYLATE 5 MG/1
TABLET ORAL
Qty: 30 TABLET | Refills: 0 | Status: SHIPPED | OUTPATIENT
Start: 2020-08-31 | End: 2020-10-13

## 2020-08-31 RX ORDER — ATORVASTATIN CALCIUM 40 MG/1
TABLET, FILM COATED ORAL
Qty: 30 TABLET | Refills: 0 | Status: SHIPPED | OUTPATIENT
Start: 2020-08-31 | End: 2020-10-13

## 2020-08-31 RX ORDER — METOPROLOL TARTRATE 50 MG/1
TABLET, FILM COATED ORAL
Qty: 60 TABLET | Refills: 0 | Status: SHIPPED | OUTPATIENT
Start: 2020-08-31 | End: 2020-10-13

## 2020-08-31 RX ORDER — FENOFIBRATE 145 MG/1
TABLET, COATED ORAL
Qty: 30 TABLET | Refills: 0 | Status: SHIPPED | OUTPATIENT
Start: 2020-08-31 | End: 2020-10-13

## 2020-08-31 NOTE — TELEPHONE ENCOUNTER
Medication Refill    Medication needing refilled:citalopram (CELEXA) 10 MG tablet    Doseage of the medication: 10 mg    How are you taking this medication (QD, BID, TID, QID, PRN): 1 tablet daily    30 or 90 day supply called in: 80    Which Pharmacy are we sending the medication to?:    [address] [place] [address] [id number] [facility], [address] [place] [address]

## 2020-08-31 NOTE — TELEPHONE ENCOUNTER
Medication can not be filled due to it not being a medication proscribed by our office - LVM explaining all his other medications have been refilled today however he would need to call his PCP in order to get the non cardiac medication refilled   Celexa 10mg was sent to pharmacy as a denied medication for refill

## 2020-10-13 RX ORDER — ATORVASTATIN CALCIUM 40 MG/1
TABLET, FILM COATED ORAL
Qty: 90 TABLET | Refills: 1 | Status: SHIPPED | OUTPATIENT
Start: 2020-10-13 | End: 2021-04-07

## 2020-10-13 RX ORDER — METOPROLOL TARTRATE 50 MG/1
TABLET, FILM COATED ORAL
Qty: 180 TABLET | Refills: 1 | Status: SHIPPED | OUTPATIENT
Start: 2020-10-13 | End: 2021-04-07

## 2020-10-13 RX ORDER — AMLODIPINE BESYLATE 5 MG/1
TABLET ORAL
Qty: 90 TABLET | Refills: 1 | Status: SHIPPED | OUTPATIENT
Start: 2020-10-13 | End: 2021-04-07

## 2020-10-13 RX ORDER — FENOFIBRATE 145 MG/1
TABLET, COATED ORAL
Qty: 90 TABLET | Refills: 1 | Status: SHIPPED | OUTPATIENT
Start: 2020-10-13 | End: 2021-04-07

## 2021-04-07 DIAGNOSIS — E78.2 MIXED HYPERLIPIDEMIA: ICD-10-CM

## 2021-04-07 DIAGNOSIS — I10 ESSENTIAL HYPERTENSION: ICD-10-CM

## 2021-04-07 DIAGNOSIS — I25.10 CORONARY ARTERY DISEASE INVOLVING NATIVE HEART WITHOUT ANGINA PECTORIS, UNSPECIFIED VESSEL OR LESION TYPE: ICD-10-CM

## 2021-04-07 RX ORDER — ATORVASTATIN CALCIUM 40 MG/1
TABLET, FILM COATED ORAL
Qty: 90 TABLET | Refills: 0 | Status: SHIPPED | OUTPATIENT
Start: 2021-04-07 | End: 2021-04-09

## 2021-04-07 RX ORDER — METOPROLOL TARTRATE 50 MG/1
TABLET, FILM COATED ORAL
Qty: 180 TABLET | Refills: 0 | Status: SHIPPED | OUTPATIENT
Start: 2021-04-07 | End: 2021-07-06

## 2021-04-07 RX ORDER — FENOFIBRATE 145 MG/1
TABLET, COATED ORAL
Qty: 90 TABLET | Refills: 0 | Status: SHIPPED | OUTPATIENT
Start: 2021-04-07 | End: 2021-07-06

## 2021-04-07 RX ORDER — AMLODIPINE BESYLATE 5 MG/1
TABLET ORAL
Qty: 90 TABLET | Refills: 0 | Status: SHIPPED | OUTPATIENT
Start: 2021-04-07 | End: 2021-04-09

## 2021-04-07 NOTE — TELEPHONE ENCOUNTER
Received refill request for amlodipine, atorvastatin, metoprolol, fenofibrate from [facility]. Last ov:2020 [facility]K    Last labs:lipid, cmp 2020    Last EK2011    Last Refill:amlodipine #90 with 1 refill, atorvastatin #90 with 1 refill, metoprolol #180 with 1 refill, fenofibrate #90 with 1 refill.  10/13/2020    Next appointment:2021 ELIA

## 2021-04-09 DIAGNOSIS — I10 ESSENTIAL HYPERTENSION: ICD-10-CM

## 2021-04-09 DIAGNOSIS — E78.2 MIXED HYPERLIPIDEMIA: ICD-10-CM

## 2021-04-09 DIAGNOSIS — I25.10 CORONARY ARTERY DISEASE INVOLVING NATIVE HEART WITHOUT ANGINA PECTORIS, UNSPECIFIED VESSEL OR LESION TYPE: ICD-10-CM

## 2021-04-09 RX ORDER — ATORVASTATIN CALCIUM 40 MG/1
TABLET, FILM COATED ORAL
Qty: 90 TABLET | Refills: 0 | Status: SHIPPED | OUTPATIENT
Start: 2021-04-09 | End: 2021-07-19 | Stop reason: SDUPTHER

## 2021-04-09 RX ORDER — AMLODIPINE BESYLATE 5 MG/1
TABLET ORAL
Qty: 90 TABLET | Refills: 0 | Status: SHIPPED | OUTPATIENT
Start: 2021-04-09 | End: 2021-07-19 | Stop reason: SDUPTHER

## 2021-07-06 DIAGNOSIS — E78.2 MIXED HYPERLIPIDEMIA: ICD-10-CM

## 2021-07-06 DIAGNOSIS — I25.10 CORONARY ARTERY DISEASE INVOLVING NATIVE HEART WITHOUT ANGINA PECTORIS, UNSPECIFIED VESSEL OR LESION TYPE: ICD-10-CM

## 2021-07-06 DIAGNOSIS — I10 ESSENTIAL HYPERTENSION: ICD-10-CM

## 2021-07-06 RX ORDER — METOPROLOL TARTRATE 50 MG/1
TABLET, FILM COATED ORAL
Qty: 180 TABLET | Refills: 0 | Status: SHIPPED | OUTPATIENT
Start: 2021-07-06 | End: 2021-07-30 | Stop reason: SDUPTHER

## 2021-07-06 RX ORDER — FENOFIBRATE 145 MG/1
TABLET, COATED ORAL
Qty: 90 TABLET | Refills: 0 | Status: SHIPPED | OUTPATIENT
Start: 2021-07-06 | End: 2021-07-30 | Stop reason: SDUPTHER

## 2021-07-06 NOTE — TELEPHONE ENCOUNTER
Received refill request for fenofibrate, metoprolol from [facility].     Last ov:8/4/2020 [facility]    Last Refill: 4/7/2021 #90 with 0 refills fenofibrate, 180 with 0 refills metoprolol    Next appointment:7/30/2021 [facility]

## 2021-07-19 DIAGNOSIS — E78.2 MIXED HYPERLIPIDEMIA: ICD-10-CM

## 2021-07-19 DIAGNOSIS — I10 ESSENTIAL HYPERTENSION: ICD-10-CM

## 2021-07-19 DIAGNOSIS — I25.10 CORONARY ARTERY DISEASE INVOLVING NATIVE HEART WITHOUT ANGINA PECTORIS, UNSPECIFIED VESSEL OR LESION TYPE: ICD-10-CM

## 2021-07-19 RX ORDER — ATORVASTATIN CALCIUM 40 MG/1
40 TABLET, FILM COATED ORAL DAILY
Qty: 90 TABLET | Refills: 3 | Status: SHIPPED | OUTPATIENT
Start: 2021-07-19

## 2021-07-19 RX ORDER — AMLODIPINE BESYLATE 5 MG/1
TABLET ORAL
Qty: 90 TABLET | Refills: 0 | Status: CANCELLED | OUTPATIENT
Start: 2021-07-19

## 2021-07-19 RX ORDER — AMLODIPINE BESYLATE 5 MG/1
5 TABLET ORAL DAILY
Qty: 90 TABLET | Refills: 3 | Status: SHIPPED | OUTPATIENT
Start: 2021-07-19

## 2021-07-19 RX ORDER — ATORVASTATIN CALCIUM 40 MG/1
TABLET, FILM COATED ORAL
Qty: 90 TABLET | Refills: 0 | Status: CANCELLED | OUTPATIENT
Start: 2021-07-19

## 2021-07-19 NOTE — TELEPHONE ENCOUNTER
Medication Refill    Medication needing refilled:  Amlodipine , atorvastatin     Dosage of the medication:    How are you taking this medication (QD, BID, TID, QID, PRN):    30 or 90 day supply called in:    When will you run out of your medication: yesterday     Which Pharmacy are we sending the medication to?:  [facility] in [address] [place] [address],[address]     appt 7/30 w yennyk

## 2021-07-30 ENCOUNTER — OFFICE VISIT (OUTPATIENT)
Dept: CARDIOLOGY CLINIC | Age: 66
End: 2021-07-30
Payer: MEDICARE

## 2021-07-30 VITALS
HEIGHT: 72 IN | HEART RATE: 76 BPM | BODY MASS INDEX: 31.69 KG/M2 | SYSTOLIC BLOOD PRESSURE: 120 MMHG | DIASTOLIC BLOOD PRESSURE: 70 MMHG | WEIGHT: 234 LBS | OXYGEN SATURATION: 97 % | RESPIRATION RATE: 17 BRPM

## 2021-07-30 DIAGNOSIS — I25.10 CORONARY ARTERY DISEASE INVOLVING NATIVE CORONARY ARTERY OF NATIVE HEART WITHOUT ANGINA PECTORIS: Primary | Chronic | ICD-10-CM

## 2021-07-30 DIAGNOSIS — I10 ESSENTIAL HYPERTENSION: Chronic | ICD-10-CM

## 2021-07-30 DIAGNOSIS — E78.2 MIXED HYPERLIPIDEMIA: Chronic | ICD-10-CM

## 2021-07-30 PROCEDURE — 99214 OFFICE O/P EST MOD 30 MIN: CPT | Performed by: INTERNAL MEDICINE

## 2021-07-30 RX ORDER — METOPROLOL TARTRATE 50 MG/1
TABLET, FILM COATED ORAL
Qty: 180 TABLET | Refills: 4 | Status: SHIPPED | OUTPATIENT
Start: 2021-07-30

## 2021-07-30 RX ORDER — FENOFIBRATE 145 MG/1
TABLET, COATED ORAL
Qty: 90 TABLET | Refills: 4 | Status: SHIPPED | OUTPATIENT
Start: 2021-07-30 | End: 2022-10-13

## 2021-07-30 NOTE — PROGRESS NOTES
[facility]  Cardiac Follow up     Referring Provider:  No primary care provider on file. Chief Complaint   Patient presents with    Hyperlipidemia     Patient return to office for 11 month follow up     Hypertension      History of Present Illness:  Mr. Cheri Peter is here for a follow-up visit on his history of CAD with MI and PCI in 2004, hypertension, and hyperlipidemia. He has not smoked since his heart attack. He is doing well. He is going on a cruise to [facility]. He denies chest pain. Minimal dyspnea that seems normal for activity. He has been hesitant to have testing due to cost. Last stress 10 years ago. Past Medical History: has a past medical history of CAD (coronary artery disease), Equilibrium disorder, Hyperlipidemia, Hypertension, and Old MI (myocardial infarction). Surgical History: has a past surgical history that includes Coronary angioplasty with stent and Cardiac catheterization. Social History: reports that he has quit smoking. His smoking use included cigarettes. He has never used smokeless tobacco. He reports current alcohol use. He reports that he does not use drugs. Family History:family history includes Heart Disease in his brother, brother, father, mother, and paternal uncle. Home Medications:  Prior to Visit Medications    Medication Sig Taking? Authorizing Provider   amLODIPine (NORVASC) 5 MG tablet Take 1 tablet by mouth daily Yes Anant Denise MD   atorvastatin (LIPITOR) 40 MG tablet Take 1 tablet by mouth daily Yes Anant Denise MD   metoprolol tartrate (LOPRESSOR) 50 MG tablet TAKE 1 TABLET BY MOUTH TWO TIMES A DAY  Yes Anant Denise MD   fenofibrate (TRICOR) 145 MG tablet TAKE 1 TABLET BY MOUTH EVERY DAY  Yes Anant Denise MD   citalopram (CELEXA) 10 MG tablet Take 10 mg by mouth daily  Yes Historical Provider, MD   aspirin 81 MG EC tablet Take 81 mg by mouth daily. Yes Historical Provider, MD       Allergies:Patient has no known allergies.      [x]

## 2021-07-30 NOTE — PATIENT INSTRUCTIONS
No med changes  Stress echo - do not take Metoprolol the evening before or morning of test  Follow up in [address] [place] in 2-3 months

## 2021-11-02 ENCOUNTER — PROCEDURE VISIT (OUTPATIENT)
Dept: CARDIOLOGY CLINIC | Age: 66
End: 2021-11-02
Payer: MEDICARE

## 2021-11-02 ENCOUNTER — OFFICE VISIT (OUTPATIENT)
Dept: CARDIOLOGY CLINIC | Age: 66
End: 2021-11-02
Payer: MEDICARE

## 2021-11-02 VITALS
OXYGEN SATURATION: 98 % | BODY MASS INDEX: 32.9 KG/M2 | HEIGHT: 71 IN | DIASTOLIC BLOOD PRESSURE: 76 MMHG | WEIGHT: 235 LBS | HEART RATE: 90 BPM | SYSTOLIC BLOOD PRESSURE: 144 MMHG

## 2021-11-02 DIAGNOSIS — E78.2 MIXED HYPERLIPIDEMIA: Chronic | ICD-10-CM

## 2021-11-02 DIAGNOSIS — I10 PRIMARY HYPERTENSION: Chronic | ICD-10-CM

## 2021-11-02 DIAGNOSIS — I25.10 CORONARY ARTERY DISEASE INVOLVING NATIVE CORONARY ARTERY OF NATIVE HEART WITHOUT ANGINA PECTORIS: Primary | Chronic | ICD-10-CM

## 2021-11-02 DIAGNOSIS — I25.10 CORONARY ARTERY DISEASE INVOLVING NATIVE CORONARY ARTERY OF NATIVE HEART WITHOUT ANGINA PECTORIS: Chronic | ICD-10-CM

## 2021-11-02 LAB
LV EF: 50 %
LVEF MODALITY: NORMAL

## 2021-11-02 PROCEDURE — 93325 DOPPLER ECHO COLOR FLOW MAPG: CPT | Performed by: INTERNAL MEDICINE

## 2021-11-02 PROCEDURE — 93320 DOPPLER ECHO COMPLETE: CPT | Performed by: INTERNAL MEDICINE

## 2021-11-02 PROCEDURE — 99214 OFFICE O/P EST MOD 30 MIN: CPT | Performed by: INTERNAL MEDICINE

## 2021-11-02 PROCEDURE — 93351 STRESS TTE COMPLETE: CPT | Performed by: INTERNAL MEDICINE

## 2021-11-19 ENCOUNTER — HOSPITAL ENCOUNTER (OUTPATIENT)
Age: 66
Discharge: HOME | End: 2021-11-19
Payer: MEDICARE

## 2021-11-19 ENCOUNTER — TELEPHONE (OUTPATIENT)
Dept: CARDIOLOGY CLINIC | Age: 66
End: 2021-11-19

## 2021-11-19 DIAGNOSIS — I25.10 CORONARY ARTERY DISEASE INVOLVING NATIVE CORONARY ARTERY OF NATIVE HEART WITHOUT ANGINA PECTORIS: Chronic | ICD-10-CM

## 2021-11-19 LAB
ALT SERPL-CCNC: 19 U/L (ref 10–40)
ANION GAP SERPL CALCULATED.3IONS-SCNC: 17 MMOL/L (ref 3–16)
AST SERPL-CCNC: 42 U/L (ref 15–37)
BUN BLDV-MCNC: 7 MG/DL (ref 7–20)
CALCIUM SERPL-MCNC: 8.9 MG/DL (ref 8.3–10.6)
CHLORIDE BLD-SCNC: 96 MMOL/L (ref 99–110)
CHOLESTEROL, FASTING: 147 MG/DL (ref 0–199)
CO2: 24 MMOL/L (ref 21–32)
CREAT SERPL-MCNC: 0.8 MG/DL (ref 0.8–1.3)
GFR AFRICAN AMERICAN: >60
GFR NON-AFRICAN AMERICAN: >60
GLUCOSE BLD-MCNC: 120 MG/DL (ref 70–99)
HCT VFR BLD CALC: 47 % (ref 40.5–52.5)
HDLC SERPL-MCNC: 42 MG/DL (ref 40–60)
HEMATOLOGY PATH CONSULT: YES
HEMOGLOBIN: 15.8 G/DL (ref 13.5–17.5)
LDL CHOLESTEROL CALCULATED: 73 MG/DL
MCH RBC QN AUTO: 37.2 PG (ref 26–34)
MCHC RBC AUTO-ENTMCNC: 33.6 G/DL (ref 31–36)
MCV RBC AUTO: 110.6 FL (ref 80–100)
PDW BLD-RTO: 15.3 % (ref 12.4–15.4)
PLATELET # BLD: 269 K/UL (ref 135–450)
PLATELET SLIDE REVIEW: ADEQUATE
PMV BLD AUTO: 8.5 FL (ref 5–10.5)
POTASSIUM SERPL-SCNC: 3.9 MMOL/L (ref 3.5–5.1)
RBC # BLD: 4.25 M/UL (ref 4.2–5.9)
SLIDE REVIEW: ABNORMAL
SODIUM BLD-SCNC: 137 MMOL/L (ref 136–145)
TRIGLYCERIDE, FASTING: 161 MG/DL (ref 0–150)
VLDLC SERPL CALC-MCNC: 32 MG/DL
WBC # BLD: 5.4 K/UL (ref 4–11)

## 2021-11-19 PROCEDURE — 80048 BASIC METABOLIC PNL TOTAL CA: CPT

## 2021-11-19 PROCEDURE — 85027 COMPLETE CBC AUTOMATED: CPT

## 2021-11-19 PROCEDURE — 80061 LIPID PANEL: CPT

## 2021-11-19 PROCEDURE — 84450 TRANSFERASE (AST) (SGOT): CPT

## 2021-11-19 PROCEDURE — 84460 ALANINE AMINO (ALT) (SGPT): CPT

## 2021-11-19 PROCEDURE — 36415 COLL VENOUS BLD VENIPUNCTURE: CPT

## 2021-11-22 ENCOUNTER — TELEPHONE (OUTPATIENT)
Dept: CARDIOLOGY CLINIC | Age: 66
End: 2021-11-22

## 2021-11-22 LAB — HEMATOLOGY PATH CONSULT: NORMAL

## 2021-11-22 NOTE — TELEPHONE ENCOUNTER
Discussed with patient. He will contact his PCP and give him the info.    ----- Message from Susy Pierre MD sent at 11/22/2021  4:07 PM EST -----  Blood cells enlarged. New analysis. Suggest evaluation by PCP.     1 [facility]

## 2022-10-12 DIAGNOSIS — E78.2 MIXED HYPERLIPIDEMIA: Chronic | ICD-10-CM

## 2022-10-12 DIAGNOSIS — I10 ESSENTIAL HYPERTENSION: Chronic | ICD-10-CM

## 2022-10-13 RX ORDER — FENOFIBRATE 145 MG/1
TABLET, COATED ORAL
Qty: 90 TABLET | Refills: 0 | Status: SHIPPED | OUTPATIENT
Start: 2022-10-13

## 2022-10-13 NOTE — TELEPHONE ENCOUNTER
Patient is due for yearly fu and fasting labs to be updated. Lab orders placed. Left message on pts VM asking him to call back to schedule appt and let us know which lab he wants orders faxed to. Refill sent in for 90 days for  now.

## 2022-10-26 DIAGNOSIS — R74.8 ELEVATED LIVER ENZYMES: Primary | ICD-10-CM

## 2022-10-26 LAB
ALT SERPL-CCNC: 24 U/L (ref 7–52)
ANION GAP 4: 13 MMOL/L (ref 7–16)
AST W/O P-5'-P: 64 U/L (ref 13–39)
BUN BLDV-MCNC: 9 MG/DL (ref 7–25)
CALCIUM SERPL-MCNC: 9 MG/DL (ref 8.6–10.2)
CHLORIDE BLD-SCNC: 101 MMOL/L (ref 98–107)
CHOLESTEROL, STONE: 123 MG/DL
CO2: 26 MMOL/L (ref 21–31)
CREATININE + EGFR PANEL: 0.69 MG/DL (ref 0.7–1.3)
EGFR MALE: > 90 ML/MIN/1.73M2
GLUCOSE: 104 MG/DL (ref 74–109)
HDLC SERPL-MCNC: 54 MG/DL (ref 40–60)
LDL CHOLESTEROL CALCULATED: 37 MG/DL (ref 0–99)
POTASSIUM SERPL-SCNC: 3.6 MMOL/L (ref 3.5–5.3)
SODIUM BLD-SCNC: 140 MMOL/L (ref 136–145)
TRIGL SERPL-MCNC: 160 MG/DL
VLDLC SERPL CALC-MCNC: 32 MG/DL (ref 0–40)

## 2022-11-17 ENCOUNTER — OFFICE VISIT (OUTPATIENT)
Dept: CARDIOLOGY CLINIC | Age: 67
End: 2022-11-17
Payer: MEDICARE

## 2022-11-17 VITALS
OXYGEN SATURATION: 98 % | BODY MASS INDEX: 30.34 KG/M2 | SYSTOLIC BLOOD PRESSURE: 140 MMHG | HEIGHT: 72 IN | DIASTOLIC BLOOD PRESSURE: 82 MMHG | WEIGHT: 224 LBS | HEART RATE: 89 BPM

## 2022-11-17 DIAGNOSIS — I10 PRIMARY HYPERTENSION: Chronic | ICD-10-CM

## 2022-11-17 DIAGNOSIS — I25.10 CORONARY ARTERY DISEASE INVOLVING NATIVE CORONARY ARTERY OF NATIVE HEART WITHOUT ANGINA PECTORIS: Primary | Chronic | ICD-10-CM

## 2022-11-17 DIAGNOSIS — I25.10 CORONARY ARTERY DISEASE INVOLVING NATIVE HEART WITHOUT ANGINA PECTORIS, UNSPECIFIED VESSEL OR LESION TYPE: ICD-10-CM

## 2022-11-17 DIAGNOSIS — R79.89 ELEVATED LFTS: ICD-10-CM

## 2022-11-17 DIAGNOSIS — E78.2 MIXED HYPERLIPIDEMIA: Chronic | ICD-10-CM

## 2022-11-17 DIAGNOSIS — I10 ESSENTIAL HYPERTENSION: Chronic | ICD-10-CM

## 2022-11-17 PROCEDURE — 99214 OFFICE O/P EST MOD 30 MIN: CPT | Performed by: INTERNAL MEDICINE

## 2022-11-17 PROCEDURE — 3074F SYST BP LT 130 MM HG: CPT | Performed by: INTERNAL MEDICINE

## 2022-11-17 PROCEDURE — 3078F DIAST BP <80 MM HG: CPT | Performed by: INTERNAL MEDICINE

## 2022-11-17 PROCEDURE — 1123F ACP DISCUSS/DSCN MKR DOCD: CPT | Performed by: INTERNAL MEDICINE

## 2022-11-17 RX ORDER — ATORVASTATIN CALCIUM 40 MG/1
40 TABLET, FILM COATED ORAL DAILY
Qty: 90 TABLET | Refills: 4 | Status: SHIPPED | OUTPATIENT
Start: 2022-11-17

## 2022-11-17 RX ORDER — FENOFIBRATE 145 MG/1
TABLET, COATED ORAL
Qty: 90 TABLET | Refills: 4 | Status: SHIPPED | OUTPATIENT
Start: 2022-11-17

## 2022-11-17 RX ORDER — AMLODIPINE BESYLATE 5 MG/1
5 TABLET ORAL DAILY
Qty: 90 TABLET | Refills: 4 | Status: SHIPPED | OUTPATIENT
Start: 2022-11-17

## 2022-11-17 RX ORDER — METOPROLOL TARTRATE 50 MG/1
TABLET, FILM COATED ORAL
Qty: 180 TABLET | Refills: 4 | Status: SHIPPED | OUTPATIENT
Start: 2022-11-17

## 2022-11-17 NOTE — PROGRESS NOTES
[facility]  Cardiac Follow up     Referring Provider:  Nancy Gracia MD     Chief Complaint   Patient presents with    Hypertension    Coronary Artery Disease    Hyperlipidemia    1 Year Follow Up      History of Present Illness:  Mr. Zia Morales is here for a follow-up visit on his history of CAD with MI and PCI in 2004, hypertension, and hyperlipidemia. He has not smoked since his heart attack. He is doing well. Traveling a lot. No chest pain or dyspnea. He took a 2 week trip to multiple states. \"I might have drank too much rum\". Past Medical History: has a past medical history of CAD (coronary artery disease), Equilibrium disorder, Hyperlipidemia, Hypertension, and Old MI (myocardial infarction). Surgical History: has a past surgical history that includes Coronary angioplasty with stent and Cardiac catheterization. Social History: reports that he has quit smoking. His smoking use included cigarettes. He has never used smokeless tobacco. He reports current alcohol use. He reports that he does not use drugs. Family History:family history includes Heart Disease in his brother, brother, father, mother, and paternal uncle. Home Medications:  Prior to Visit Medications    Medication Sig Taking? Authorizing Provider   fenofibrate (TRICOR) 145 MG tablet TAKE 1 TABLET BY MOUTH EVERY DAY Yes Earl Dao MD   metoprolol tartrate (LOPRESSOR) 50 MG tablet Take 1 tab by mouth twice a day Yes Earl Dao MD   amLODIPine (NORVASC) 5 MG tablet Take 1 tablet by mouth daily Yes Earl Dao MD   atorvastatin (LIPITOR) 40 MG tablet Take 1 tablet by mouth daily Yes Earl Dao MD   aspirin 81 MG EC tablet Take 81 mg by mouth daily. Yes Historical Provider, MD       Allergies:Patient has no known allergies. [x] Medications and dosages reviewed.   ROS:  [x]Full ROS obtained and negative except as mentioned in HPI      Physical Examination:    Vitals:    11/17/22 0857   BP: (!) 140/82   Pulse: SpO2:    BP (!) 140/82 (Site: Right Upper Arm, Position: Sitting, Cuff Size: Medium Adult)   Pulse 89   Ht 5' 11.5\" (1.816 m)   Wt 224 lb (101.6 kg)   SpO2 98%   BMI 30.81 kg/m²            GENERAL: Well developed, well nourished, No acute distress  NEUROLOGICAL: Alert and oriented  PSYCH: Calm affect  SKIN: Warm and dry, no rash  HEENT: Normocephalic, Sclera non-icteric, mucus membranes moist  NECK: supple, JVP normal  CAROTID: Normal upstroke, no bruits  CARDIAC: Normal PMI, regular rate and rhythm, normal S1S2, No murmur, rub, or gallop  RESPIRATORY: Normal respiratory effort, clear to auscultation bilaterally  EXTREMITIES: No LE edema  MUSCULOSKELETAL: No joint swelling or tenderness, no chest wall tenderness  GASTROINTESTINAL: normal bowel sounds, soft, non-tender, no bruit    STRESS ECHO 11/2/2021     Normal left ventricle size and systolic function with an estimated ejection   fraction of 60%. No regional wall motion abnormalities are seen. There is mild concentric left ventricular hypertrophy. E/e\"= 12. Diastolic filling parameters suggests normal diastolic function. Trivial pulmonic regurgitation. IVC not well visualized. Summary    Normal stress echocardiogram study. 90% of predicted HR. Technically difficult images post exercise d/t lung    interference. There was stress induced shortness of breath 6/10. Peak pulse ox 98%    No chest pain. Assessment:   1. CAD:   Stable. No angina. Medical therapy. Stress ECHO (11/2/2021) normal  2003 cath> s/p Cypher stent to mid LAD.    2004 cath due to positive stress test> patent stent and no other disease noted. 6/2011 Myoview GXT showed normal perfusion with EF 71%. 2. Hyperlipidemia: LDL=37, controlled. Continue on lipitor . AST up to 64 (drinks EtOH) Decrease alcohol. Recheck LFTS 3 months   3. Hypertension: Blood pressure (!) 140/82, pulse 89, height 5' 11.5\" (1.816 m), weight 224 lb (101.6 kg), SpO2 98 %. Controlled.  Continue lopressor and norvasc       Plan:  Decrease alcohol. LFTs 3 months  F/u 1 year.     Thank you for allowing me to participate in the care of this individual.    Francine Donis M.D., [facility]

## 2023-07-07 ENCOUNTER — OFFICE VISIT (OUTPATIENT)
Dept: CARDIOLOGY CLINIC | Age: 68
End: 2023-07-07
Payer: MEDICARE

## 2023-07-07 VITALS
WEIGHT: 234 LBS | OXYGEN SATURATION: 98 % | SYSTOLIC BLOOD PRESSURE: 124 MMHG | HEIGHT: 72 IN | BODY MASS INDEX: 31.69 KG/M2 | DIASTOLIC BLOOD PRESSURE: 80 MMHG | HEART RATE: 73 BPM

## 2023-07-07 DIAGNOSIS — Z95.5 S/P RIGHT CORONARY ARTERY (RCA) STENT PLACEMENT: ICD-10-CM

## 2023-07-07 DIAGNOSIS — I25.10 CORONARY ARTERY DISEASE INVOLVING NATIVE CORONARY ARTERY OF NATIVE HEART WITHOUT ANGINA PECTORIS: Primary | Chronic | ICD-10-CM

## 2023-07-07 DIAGNOSIS — I10 PRIMARY HYPERTENSION: Chronic | ICD-10-CM

## 2023-07-07 DIAGNOSIS — E78.2 MIXED HYPERLIPIDEMIA: Chronic | ICD-10-CM

## 2023-07-07 PROCEDURE — 99214 OFFICE O/P EST MOD 30 MIN: CPT | Performed by: INTERNAL MEDICINE

## 2023-07-07 PROCEDURE — 3079F DIAST BP 80-89 MM HG: CPT | Performed by: INTERNAL MEDICINE

## 2023-07-07 PROCEDURE — 1123F ACP DISCUSS/DSCN MKR DOCD: CPT | Performed by: INTERNAL MEDICINE

## 2023-07-07 PROCEDURE — 3074F SYST BP LT 130 MM HG: CPT | Performed by: INTERNAL MEDICINE

## 2023-07-07 RX ORDER — PANTOPRAZOLE SODIUM 40 MG/1
TABLET, DELAYED RELEASE ORAL
COMMUNITY
Start: 2023-06-20

## 2023-07-07 RX ORDER — CLOPIDOGREL BISULFATE 75 MG/1
75 TABLET ORAL DAILY
COMMUNITY
Start: 2023-06-20

## 2023-07-07 RX ORDER — LISINOPRIL 2.5 MG/1
2.5 TABLET ORAL DAILY
COMMUNITY
Start: 2023-06-20

## 2023-07-07 NOTE — PROGRESS NOTES
[address] [facility]  Cardiac Follow up     Referring Provider:  Mari Vail MD     Chief Complaint   Patient presents with    Follow-Up from Hospital    Coronary Artery Disease    Hypertension    Hyperlipidemia      History of Present Illness:  Mr. Yun Pleitez is here for a follow-up visit on his history of CAD with MI and PCI in 2004, hypertension, and hyperlipidemia. He has not smoked since his heart attack. He presented to [facility] June 2023 with acute inferior myocardial infarction. He underwent stenting of the right coronary artery. He was found has some restenosis of an LAD stent and that was angioplastied. He had an echocardiogram showing normal left ventricular function. He is now feeling well without complaints. He refuses cardiac rehab. He has had no chest pain or tightness. He has noticed some lower extremity edema. That has improved by decreasing sodium of the last few days. Past Medical History: has a past medical history of CAD (coronary artery disease), Equilibrium disorder, Hyperlipidemia, Hypertension, and Old MI (myocardial infarction). Surgical History: has a past surgical history that includes Coronary angioplasty with stent and Cardiac catheterization. Social History: reports that he has quit smoking. His smoking use included cigarettes. He has never used smokeless tobacco. He reports current alcohol use. He reports that he does not use drugs. Family History:family history includes Heart Disease in his brother, brother, father, mother, and paternal uncle. Home Medications:  Prior to Visit Medications    Medication Sig Taking?  Authorizing Provider   clopidogrel (PLAVIX) 75 MG tablet Take 1 tablet by mouth daily Yes Historical Provider, MD   lisinopril (PRINIVIL;ZESTRIL) 2.5 MG tablet Take 1 tablet by mouth daily Yes Historical Provider, MD   pantoprazole (PROTONIX) 40 MG tablet Take 1 tablet by mouth daily at 6 AM. Yes Historical Provider, MD   metoprolol tartrate

## 2023-07-07 NOTE — PATIENT INSTRUCTIONS
Treadmill stress test - hold Metoprolol morning of test  No med changes  Low sodium diet  Follow up in 2-3 months

## 2023-07-13 ENCOUNTER — HOSPITAL ENCOUNTER (OUTPATIENT)
Dept: NON INVASIVE DIAGNOSTICS | Age: 68
Discharge: HOME | End: 2023-07-13
Payer: MEDICARE

## 2023-07-13 DIAGNOSIS — Z95.5 S/P CORONARY ARTERY STENT PLACEMENT: ICD-10-CM

## 2023-07-13 DIAGNOSIS — I21.19 INFERIOR MI (HCC): Primary | ICD-10-CM

## 2023-07-13 DIAGNOSIS — Z95.5 S/P RIGHT CORONARY ARTERY (RCA) STENT PLACEMENT: ICD-10-CM

## 2023-07-13 DIAGNOSIS — I25.10 CORONARY ARTERY DISEASE INVOLVING NATIVE CORONARY ARTERY OF NATIVE HEART WITHOUT ANGINA PECTORIS: Chronic | ICD-10-CM

## 2023-07-13 PROCEDURE — 93017 CV STRESS TEST TRACING ONLY: CPT | Performed by: INTERNAL MEDICINE

## 2023-07-13 NOTE — PROGRESS NOTES
Patient instructed on [facility] Protocol Stress Test Procedure including possible side effects and adverse reactions. Verbalizes knowledge and understanding and denies having any questions.

## 2023-07-20 ENCOUNTER — TELEPHONE (OUTPATIENT)
Dept: CARDIOLOGY CLINIC | Age: 68
End: 2023-07-20

## 2023-07-20 NOTE — TELEPHONE ENCOUNTER
I called [facility]. Rosita Nuno stated she dont want any referral. She cant see the new referral for cardiac rehab from 7/13/23. She can see the old referral. So she requested us to put new referral for pt. I told her there is a referral from 7/13/23 in pt's chart. Pt can make an appt. She v/u and will let pt know.

## 2023-07-20 NOTE — TELEPHONE ENCOUNTER
Clarke Cornell called to inform the office that the Pt stated he wants to do his Cardiac Rehab at [facility]. Please send over the referral.  Please advise.   Thank you

## 2023-08-01 ENCOUNTER — TELEPHONE (OUTPATIENT)
Dept: CARDIAC REHAB | Age: 68
End: 2023-08-01

## 2023-10-17 ENCOUNTER — OFFICE VISIT (OUTPATIENT)
Dept: CARDIOLOGY CLINIC | Age: 68
End: 2023-10-17
Payer: MEDICARE

## 2023-10-17 VITALS
SYSTOLIC BLOOD PRESSURE: 130 MMHG | HEIGHT: 72 IN | WEIGHT: 233 LBS | HEART RATE: 88 BPM | DIASTOLIC BLOOD PRESSURE: 70 MMHG | OXYGEN SATURATION: 97 % | BODY MASS INDEX: 31.56 KG/M2

## 2023-10-17 DIAGNOSIS — E78.2 MIXED HYPERLIPIDEMIA: Chronic | ICD-10-CM

## 2023-10-17 DIAGNOSIS — I25.10 CORONARY ARTERY DISEASE INVOLVING NATIVE HEART WITHOUT ANGINA PECTORIS, UNSPECIFIED VESSEL OR LESION TYPE: ICD-10-CM

## 2023-10-17 DIAGNOSIS — I10 ESSENTIAL HYPERTENSION: Chronic | ICD-10-CM

## 2023-10-17 PROCEDURE — 3078F DIAST BP <80 MM HG: CPT | Performed by: INTERNAL MEDICINE

## 2023-10-17 PROCEDURE — 3075F SYST BP GE 130 - 139MM HG: CPT | Performed by: INTERNAL MEDICINE

## 2023-10-17 PROCEDURE — 99214 OFFICE O/P EST MOD 30 MIN: CPT | Performed by: INTERNAL MEDICINE

## 2023-10-17 PROCEDURE — 1123F ACP DISCUSS/DSCN MKR DOCD: CPT | Performed by: INTERNAL MEDICINE

## 2023-10-17 RX ORDER — FENOFIBRATE 145 MG/1
TABLET, COATED ORAL
Qty: 90 TABLET | Refills: 4 | Status: SHIPPED | OUTPATIENT
Start: 2023-10-17

## 2023-10-17 RX ORDER — ATORVASTATIN CALCIUM 40 MG/1
40 TABLET, FILM COATED ORAL DAILY
Qty: 90 TABLET | Refills: 4 | Status: SHIPPED | OUTPATIENT
Start: 2023-10-17

## 2023-10-17 RX ORDER — AMLODIPINE BESYLATE 5 MG/1
5 TABLET ORAL DAILY
Qty: 90 TABLET | Refills: 4 | Status: SHIPPED | OUTPATIENT
Start: 2023-10-17

## 2023-10-17 RX ORDER — METOPROLOL TARTRATE 50 MG/1
TABLET, FILM COATED ORAL
Qty: 180 TABLET | Refills: 4 | Status: SHIPPED | OUTPATIENT
Start: 2023-10-17

## 2023-10-17 NOTE — PROGRESS NOTES
[address] [facility]  Cardiac Follow up     Referring Provider:  Jose Santiago MD     Chief Complaint   Patient presents with    3 Month Follow-Up    Coronary Artery Disease    Hypertension    Hyperlipidemia      History of Present Illness:  Mr. Lowell Mendosa is here for a follow-up visit on his history of CAD with MI and PCI in 2004, hypertension, and hyperlipidemia. He has not smoked since his heart attack. He presented to [facility] June 2023 with acute inferior myocardial infarction. He underwent stenting of the right coronary artery. He was found has some restenosis of an LAD stent and that was angioplastied. He had an echocardiogram showing normal left ventricular function. He is doing very well. He did not go through cardiac rehab due to cost. [facility] for exercise. No chest pain or dyspnea. Past Medical History: has a past medical history of CAD (coronary artery disease), Equilibrium disorder, Hyperlipidemia, Hypertension, and Old MI (myocardial infarction). Surgical History: has a past surgical history that includes Coronary angioplasty with stent and Cardiac catheterization. Social History: reports that he has quit smoking. His smoking use included cigarettes. He has never used smokeless tobacco. He reports current alcohol use. He reports that he does not use drugs. Family History:family history includes Heart Disease in his brother, brother, father, mother, and paternal uncle. Home Medications:  Prior to Visit Medications    Medication Sig Taking?  Authorizing Provider   clopidogrel (PLAVIX) 75 MG tablet Take 1 tablet by mouth daily Yes Saji Vasquez MD   lisinopril (PRINIVIL;ZESTRIL) 2.5 MG tablet Take 1 tablet by mouth daily Yes Saji Vasquez MD   metoprolol tartrate (LOPRESSOR) 50 MG tablet Take 1 tab by mouth twice a day Yes Pablo Gavin MD   amLODIPine (NORVASC) 5 MG tablet Take 1 tablet by mouth daily Yes Pablo Gavin MD   atorvastatin (LIPITOR) 40 MG tablet Take

## 2023-12-15 ENCOUNTER — TELEPHONE (OUTPATIENT)
Dept: CARDIOLOGY CLINIC | Age: 68
End: 2023-12-15

## 2023-12-15 ENCOUNTER — HOSPITAL ENCOUNTER (OUTPATIENT)
Age: 68
Discharge: HOME | End: 2023-12-15
Payer: MEDICARE

## 2023-12-15 ENCOUNTER — OFFICE VISIT (OUTPATIENT)
Dept: CARDIOLOGY CLINIC | Age: 68
End: 2023-12-15
Payer: MEDICARE

## 2023-12-15 VITALS
WEIGHT: 228 LBS | DIASTOLIC BLOOD PRESSURE: 62 MMHG | HEIGHT: 71 IN | SYSTOLIC BLOOD PRESSURE: 110 MMHG | HEART RATE: 51 BPM | OXYGEN SATURATION: 98 % | BODY MASS INDEX: 31.92 KG/M2

## 2023-12-15 DIAGNOSIS — I10 PRIMARY HYPERTENSION: ICD-10-CM

## 2023-12-15 DIAGNOSIS — R06.09 DOE (DYSPNEA ON EXERTION): Primary | ICD-10-CM

## 2023-12-15 DIAGNOSIS — R06.09 DOE (DYSPNEA ON EXERTION): ICD-10-CM

## 2023-12-15 DIAGNOSIS — I25.10 CORONARY ARTERY DISEASE INVOLVING NATIVE HEART WITHOUT ANGINA PECTORIS, UNSPECIFIED VESSEL OR LESION TYPE: ICD-10-CM

## 2023-12-15 PROBLEM — D62 ACUTE BLOOD LOSS ANEMIA: Status: ACTIVE | Noted: 2023-12-15

## 2023-12-15 LAB
ALBUMIN SERPL-MCNC: 4.2 G/DL (ref 3.4–5)
ALBUMIN/GLOB SERPL: 1.4 {RATIO} (ref 1.1–2.2)
ALP SERPL-CCNC: 70 U/L (ref 40–129)
ALT SERPL-CCNC: 15 U/L (ref 10–40)
ANION GAP SERPL CALCULATED.3IONS-SCNC: 13 MMOL/L (ref 3–16)
AST SERPL-CCNC: 28 U/L (ref 15–37)
BILIRUB SERPL-MCNC: 1.2 MG/DL (ref 0–1)
BUN SERPL-MCNC: 10 MG/DL (ref 7–20)
CALCIUM SERPL-MCNC: 8.8 MG/DL (ref 8.3–10.6)
CHLORIDE SERPL-SCNC: 94 MMOL/L (ref 99–110)
CO2 SERPL-SCNC: 27 MMOL/L (ref 21–32)
CREAT SERPL-MCNC: 0.8 MG/DL (ref 0.8–1.3)
DEPRECATED RDW RBC AUTO: 16.1 % (ref 12.4–15.4)
GFR SERPLBLD CREATININE-BSD FMLA CKD-EPI: >60 ML/MIN/{1.73_M2}
GLUCOSE SERPL-MCNC: 165 MG/DL (ref 70–99)
HCT VFR BLD AUTO: 19.6 % (ref 40.5–52.5)
HGB BLD-MCNC: 6.8 G/DL (ref 13.5–17.5)
MCH RBC QN AUTO: 46.5 PG (ref 26–34)
MCHC RBC AUTO-ENTMCNC: 34.6 G/DL (ref 31–36)
MCV RBC AUTO: 133.8 FL (ref 80–100)
NT-PROBNP SERPL-MCNC: 82 PG/ML (ref 0–124)
PATH INTERP BLD-IMP: NO
PLATELET # BLD AUTO: 201 K/UL (ref 135–450)
PLATELET BLD QL SMEAR: ADEQUATE
PMV BLD AUTO: 8.5 FL (ref 5–10.5)
POTASSIUM SERPL-SCNC: 4.2 MMOL/L (ref 3.5–5.1)
PROT SERPL-MCNC: 7.1 G/DL (ref 6.4–8.2)
RBC # BLD AUTO: 1.46 M/UL (ref 4.2–5.9)
SLIDE REVIEW: ABNORMAL
SODIUM SERPL-SCNC: 134 MMOL/L (ref 136–145)
WBC # BLD AUTO: 3.4 K/UL (ref 4–11)

## 2023-12-15 PROCEDURE — 3078F DIAST BP <80 MM HG: CPT | Performed by: NURSE PRACTITIONER

## 2023-12-15 PROCEDURE — 85027 COMPLETE CBC AUTOMATED: CPT

## 2023-12-15 PROCEDURE — 83880 ASSAY OF NATRIURETIC PEPTIDE: CPT

## 2023-12-15 PROCEDURE — 93000 ELECTROCARDIOGRAM COMPLETE: CPT | Performed by: NURSE PRACTITIONER

## 2023-12-15 PROCEDURE — 80053 COMPREHEN METABOLIC PANEL: CPT

## 2023-12-15 PROCEDURE — 99214 OFFICE O/P EST MOD 30 MIN: CPT | Performed by: NURSE PRACTITIONER

## 2023-12-15 PROCEDURE — 1123F ACP DISCUSS/DSCN MKR DOCD: CPT | Performed by: NURSE PRACTITIONER

## 2023-12-15 PROCEDURE — 36415 COLL VENOUS BLD VENIPUNCTURE: CPT

## 2023-12-15 PROCEDURE — 3074F SYST BP LT 130 MM HG: CPT | Performed by: NURSE PRACTITIONER

## 2023-12-15 NOTE — TELEPHONE ENCOUNTER
Per [facility]- patient needs to go to ER. Called and spoke to patient's wife. She will bring patient to [facility] ER.

## 2023-12-15 NOTE — PROGRESS NOTES
displaced  JVP less than 8 cm H2O  Heart tones are crisp and normal  Cervical veins are not engorged  The carotid upstroke is normal in amplitude and contour without delay or bruit  JVP is not elevated  ABDOMEN:  Normal bowel sounds, non-distended and non-tender to palpation  EXT: trace Lt ankle edema, no calf tenderness. Pulses are present bilaterally. DATA:    Lab Results   Component Value Date    ALT 24 10/26/2022    AST 64 (A) 10/26/2022    ALKPHOS 45 08/04/2020    BILITOT 0.5 08/04/2020     Lab Results   Component Value Date    CREATININE 0.8 11/19/2021    BUN 9 10/26/2022     10/26/2022    K 3.6 10/26/2022     10/26/2022    CO2 26 10/26/2022     No results found for: \"TSH\", \"N3OVLBH\", \"N8UFAWC\", \"THYROIDAB\"  Lab Results   Component Value Date    WBC 5.4 11/19/2021    HGB 15.8 11/19/2021    HCT 47.0 11/19/2021    .6 (H) 11/19/2021     11/19/2021     No components found for: \"CHLPL\"  Lab Results   Component Value Date    TRIG 160 (A) 10/26/2022    TRIG 130 08/04/2020    TRIG 142 11/04/2017     Lab Results   Component Value Date    HDL 54 10/26/2022    HDL 42 11/19/2021    HDL 45 08/04/2020     Lab Results   Component Value Date    LDLCALC 37 10/26/2022    LDLCALC 73 11/19/2021    LDLCALC 100 (H) 08/04/2020     Lab Results   Component Value Date    LABVLDL 32 10/26/2022    LABVLDL 32 11/19/2021    LABVLDL 26 08/04/2020       Radiology Review:  Pertinent images / reports were reviewed as a part of this visit and reveals the following:    Echo: 6/17/2023 [facility]  1. Left ventricle: The cavity size was normal. Wall thickness was  normal. Systolic function was normal. The estimated ejection  fraction was in the range of 55% to 60%. Wall motion was normal;  there were no regional wall motion abnormalities. Doppler  parameters are consistent with abnormal left ventricular  relaxation (grade 1 diastolic dysfunction). 2. Mitral valve: The annulus was mildly to moderately calcified.   The leaflets

## 2023-12-26 ENCOUNTER — OFFICE VISIT (OUTPATIENT)
Dept: CARDIOLOGY CLINIC | Age: 68
End: 2023-12-26
Payer: MEDICARE

## 2023-12-26 VITALS
HEIGHT: 71 IN | BODY MASS INDEX: 30.69 KG/M2 | SYSTOLIC BLOOD PRESSURE: 122 MMHG | DIASTOLIC BLOOD PRESSURE: 62 MMHG | HEART RATE: 81 BPM | OXYGEN SATURATION: 93 % | WEIGHT: 219.2 LBS

## 2023-12-26 DIAGNOSIS — R06.09 DOE (DYSPNEA ON EXERTION): Primary | ICD-10-CM

## 2023-12-26 DIAGNOSIS — I25.10 CORONARY ARTERY DISEASE INVOLVING NATIVE HEART WITHOUT ANGINA PECTORIS, UNSPECIFIED VESSEL OR LESION TYPE: ICD-10-CM

## 2023-12-26 DIAGNOSIS — I10 PRIMARY HYPERTENSION: ICD-10-CM

## 2023-12-26 PROCEDURE — 3078F DIAST BP <80 MM HG: CPT | Performed by: NURSE PRACTITIONER

## 2023-12-26 PROCEDURE — 3074F SYST BP LT 130 MM HG: CPT | Performed by: NURSE PRACTITIONER

## 2023-12-26 PROCEDURE — 99214 OFFICE O/P EST MOD 30 MIN: CPT | Performed by: NURSE PRACTITIONER

## 2023-12-26 PROCEDURE — 1123F ACP DISCUSS/DSCN MKR DOCD: CPT | Performed by: NURSE PRACTITIONER

## 2023-12-26 NOTE — PROGRESS NOTES
[facility]     Outpatient Follow Up Note  Acute visit    Erin Iverson is 77 y.o. male who presents today with a history of MI / CAD s/p PTCA LAD '04; IWMI s/p PTCA RCA Jun '23 with instent stenosis LAD s/p PTCA ; HTN and hyperlipidemia    Interval hx: 12/15 - 12/20/23  Erin Iverson is a 77 y.o. male with a past medical history of hypertension, hyperlipidemia, CAD s/p stents and IWMI 6/17/2023 with PTCA to RCA and LAD who presented with  SOB/weakness and acute anemia at the recommendation of cardiology office. Seen in cardiology office 12/15 echo was ordered and  Hgb 6.8 and he was sent to ER. Received 3 units PRBCs. S/p EGD and colonoscopy without acute findings.        Assessment and Plan:  Acute Symptomatic Blood Loss Anemia              - Upper GI bleed suspected with recent NSAID use at home              - Goal Hgb >8 with recent hx of MI/CAD                          ~ S/p 3 units PRBCs                           ~ Hgb 8.2 today and stable              - Received 2 unit PRBCs 12/15 and 1 unit 12/18              - GI consultation- EGD showed gastritis wihtout acute bleed, planning for colonoscopy 12/19 unrevealing              - Hgb remains stable, VSS he is on home BP medications, he ahs had several normal BMs and occult stool was negative              - Needs hematology follow up to consider BMbx as OP if anemia persists; discussed with patient and wife, wanting this to be done as OP which is appropriate              - Macrocytic anemia- Folate deficiency, supplements added, discussed high folate diet, needs CBC in 5 days, he will get on Tues   ETOH Use              - Discussed complete abstinence from ETOH use  Hx of CAD              - Multiple stents, IWMI 6/17/2023 s/p PTCA RCA Jun '23 with instent stenosis LAD s/p PTCA               - Plavix was held due to acute anemia              - Cardiology consulted >6 months since last intervention and now with acute anemia, recommended to stop Plavix at

## 2024-01-22 DIAGNOSIS — E78.2 MIXED HYPERLIPIDEMIA: Chronic | ICD-10-CM

## 2024-01-22 DIAGNOSIS — I10 ESSENTIAL HYPERTENSION: Chronic | ICD-10-CM

## 2024-01-22 RX ORDER — METOPROLOL TARTRATE 50 MG/1
TABLET, FILM COATED ORAL
Qty: 180 TABLET | Refills: 4 | Status: SHIPPED | OUTPATIENT
Start: 2024-01-22

## 2024-01-22 RX ORDER — FENOFIBRATE 145 MG/1
TABLET, COATED ORAL
Qty: 90 TABLET | Refills: 4 | Status: SHIPPED | OUTPATIENT
Start: 2024-01-22

## 2024-01-22 RX ORDER — LISINOPRIL 2.5 MG/1
2.5 TABLET ORAL DAILY
Qty: 90 TABLET | Refills: 4 | Status: SHIPPED | OUTPATIENT
Start: 2024-01-22

## 2024-01-22 NOTE — TELEPHONE ENCOUNTER
Medication Refill    Medication needing refilled:  fenofibrate (TRICOR) 145 MG tablet / TAKE 1 TAB BY MOUTH DAILY     lisinopril (PRINIVIL;ZESTRIL) 2.5 MG tablet / Take 1 tablet by mouth daily     metoprolol tartrate (LOPRESSOR) 50 MG tablet / Take 1 tab by mouth twice a day       30 or 90 day supply called in:  90 Day    Which Pharmacy are we sending the medication to?:  [id number] - [address] [phone] [facility] [phone]

## 2024-04-18 ENCOUNTER — OFFICE VISIT (OUTPATIENT)
Dept: CARDIOLOGY CLINIC | Age: 69
End: 2024-04-18
Payer: MEDICARE

## 2024-04-18 VITALS
SYSTOLIC BLOOD PRESSURE: 126 MMHG | HEART RATE: 112 BPM | OXYGEN SATURATION: 96 % | HEIGHT: 72 IN | BODY MASS INDEX: 27.35 KG/M2 | WEIGHT: 201.9 LBS | DIASTOLIC BLOOD PRESSURE: 74 MMHG

## 2024-04-18 DIAGNOSIS — I10 PRIMARY HYPERTENSION: Chronic | ICD-10-CM

## 2024-04-18 DIAGNOSIS — R00.0 TACHYCARDIA: ICD-10-CM

## 2024-04-18 DIAGNOSIS — I25.10 CORONARY ARTERY DISEASE INVOLVING NATIVE CORONARY ARTERY OF NATIVE HEART WITHOUT ANGINA PECTORIS: Primary | Chronic | ICD-10-CM

## 2024-04-18 DIAGNOSIS — E78.2 MIXED HYPERLIPIDEMIA: Chronic | ICD-10-CM

## 2024-04-18 DIAGNOSIS — D62 ACUTE BLOOD LOSS ANEMIA: ICD-10-CM

## 2024-04-18 PROCEDURE — 3078F DIAST BP <80 MM HG: CPT | Performed by: INTERNAL MEDICINE

## 2024-04-18 PROCEDURE — 1123F ACP DISCUSS/DSCN MKR DOCD: CPT | Performed by: INTERNAL MEDICINE

## 2024-04-18 PROCEDURE — 99214 OFFICE O/P EST MOD 30 MIN: CPT | Performed by: INTERNAL MEDICINE

## 2024-04-18 PROCEDURE — 3074F SYST BP LT 130 MM HG: CPT | Performed by: INTERNAL MEDICINE

## 2024-04-18 NOTE — PROGRESS NOTES
[facility]  Cardiac Follow up     Referring Provider:  Derek Griffith MD     Chief Complaint   Patient presents with    Hyperlipidemia    Hypertension    Coronary Artery Disease    6 Month Follow-Up      History of Present Illness:  Mr. Brown is here for a follow-up visit on his history of CAD with MI and PCI in 2004, hypertension, and hyperlipidemia. He has not smoked since his heart attack.    He presented to [facility] June 2023 with acute inferior myocardial infarction.  He underwent stenting of the right coronary artery.  He was found has some restenosis of an LAD stent and that was angioplastied.  He had an echocardiogram showing normal left ventricular function.      He is doing well. No angina. Tolerating meds  Admitted 12/2023 with anemia. HgB=6. EGD and colonoscopy with no source. Also folate deficient. He was told to f/u GI to consider capsule endoscopy but hasn't.     Past Medical History: has a past medical history of CAD (coronary artery disease), Equilibrium disorder, Hyperlipidemia, Hypertension, and Old MI (myocardial infarction).  Surgical History: has a past surgical history that includes Coronary angioplasty with stent; Cardiac catheterization; Upper gastrointestinal endoscopy (N/A, 12/18/2023); and Colonoscopy (N/A, 12/19/2023).   Social History: reports that he has quit smoking. His smoking use included cigarettes. He has never used smokeless tobacco. He reports current alcohol use. He reports that he does not use drugs.   Family History:family history includes Heart Disease in his brother, brother, father, mother, and paternal uncle.   Home Medications:  Prior to Visit Medications    Medication Sig Taking? Authorizing Provider   fenofibrate (TRICOR) 145 MG tablet TAKE 1 TAB BY MOUTH DAILY Yes Fadi Hickman MD   lisinopril (PRINIVIL;ZESTRIL) 2.5 MG tablet Take 1 tablet by mouth daily Yes Fadi Hickman MD   metoprolol tartrate (LOPRESSOR) 50 MG tablet Take 1 tab by mouth twice

## 2024-04-19 ENCOUNTER — HOSPITAL ENCOUNTER (OUTPATIENT)
Age: 69
Discharge: HOME | End: 2024-04-19
Payer: MEDICARE

## 2024-04-19 DIAGNOSIS — R00.0 TACHYCARDIA: ICD-10-CM

## 2024-04-19 DIAGNOSIS — I25.10 CORONARY ARTERY DISEASE INVOLVING NATIVE CORONARY ARTERY OF NATIVE HEART WITHOUT ANGINA PECTORIS: Chronic | ICD-10-CM

## 2024-04-19 LAB
ALT SERPL-CCNC: 15 U/L (ref 10–40)
ANION GAP SERPL CALCULATED.3IONS-SCNC: 14 MMOL/L (ref 3–16)
AST SERPL-CCNC: 34 U/L (ref 15–37)
BUN SERPL-MCNC: 7 MG/DL (ref 7–20)
CALCIUM SERPL-MCNC: 9.9 MG/DL (ref 8.3–10.6)
CHLORIDE SERPL-SCNC: 94 MMOL/L (ref 99–110)
CHOLEST SERPL-MCNC: 171 MG/DL (ref 0–199)
CO2 SERPL-SCNC: 27 MMOL/L (ref 21–32)
CREAT SERPL-MCNC: 0.6 MG/DL (ref 0.8–1.3)
DEPRECATED RDW RBC AUTO: 24.2 % (ref 12.4–15.4)
GFR SERPLBLD CREATININE-BSD FMLA CKD-EPI: >90 ML/MIN/{1.73_M2}
GLUCOSE SERPL-MCNC: 132 MG/DL (ref 70–99)
HCT VFR BLD AUTO: 39.9 % (ref 40.5–52.5)
HDLC SERPL-MCNC: 63 MG/DL (ref 40–60)
HGB BLD-MCNC: 14 G/DL (ref 13.5–17.5)
LDL CHOLESTEROL CALCULATED: 84 MG/DL
MCH RBC QN AUTO: 39.3 PG (ref 26–34)
MCHC RBC AUTO-ENTMCNC: 35 G/DL (ref 31–36)
MCV RBC AUTO: 112.3 FL (ref 80–100)
PATH INTERP BLD-IMP: NO
PLATELET # BLD AUTO: 334 K/UL (ref 135–450)
PMV BLD AUTO: 9 FL (ref 5–10.5)
POTASSIUM SERPL-SCNC: 3.8 MMOL/L (ref 3.5–5.1)
RBC # BLD AUTO: 3.56 M/UL (ref 4.2–5.9)
SODIUM SERPL-SCNC: 135 MMOL/L (ref 136–145)
TRIGL SERPL-MCNC: 120 MG/DL (ref 0–150)
TSH SERPL DL<=0.005 MIU/L-ACNC: 1.9 UIU/ML (ref 0.27–4.2)
VLDLC SERPL CALC-MCNC: 24 MG/DL
WBC # BLD AUTO: 5.5 K/UL (ref 4–11)

## 2024-04-19 PROCEDURE — 36415 COLL VENOUS BLD VENIPUNCTURE: CPT

## 2024-04-19 PROCEDURE — 80048 BASIC METABOLIC PNL TOTAL CA: CPT

## 2024-04-19 PROCEDURE — 80061 LIPID PANEL: CPT

## 2024-04-19 PROCEDURE — 84450 TRANSFERASE (AST) (SGOT): CPT

## 2024-04-19 PROCEDURE — 84443 ASSAY THYROID STIM HORMONE: CPT

## 2024-04-19 PROCEDURE — 84460 ALANINE AMINO (ALT) (SGPT): CPT

## 2024-04-19 PROCEDURE — 85027 COMPLETE CBC AUTOMATED: CPT

## 2024-06-25 DIAGNOSIS — E78.2 MIXED HYPERLIPIDEMIA: Chronic | ICD-10-CM

## 2024-06-25 DIAGNOSIS — I25.10 CORONARY ARTERY DISEASE INVOLVING NATIVE HEART WITHOUT ANGINA PECTORIS, UNSPECIFIED VESSEL OR LESION TYPE: ICD-10-CM

## 2024-06-25 DIAGNOSIS — I10 ESSENTIAL HYPERTENSION: Chronic | ICD-10-CM

## 2024-06-25 RX ORDER — AMLODIPINE BESYLATE 5 MG/1
5 TABLET ORAL DAILY
Qty: 90 TABLET | Refills: 4 | Status: SHIPPED | OUTPATIENT
Start: 2024-06-25

## 2024-06-25 NOTE — TELEPHONE ENCOUNTER
Medication Refill    Medication needing refilled: amLODIPine (NORVASC) 5 MG tablet     Dosage of the medication:  5 mg    How are you taking this medication (QD, BID, TID, QID, PRN): 1 a day    30 or 90 day supply called in:  90    When will you run out of your medication:  2 days    Which Pharmacy are we sending the medication to?:    [id number] - [place], [place] - [address] [place] [address] [phone] -  [phone]  [address] [place] [id number]  Phone: [phone]  Fax: [phone]                           Surgery Date: 11/16/2022     Surgeon(s) and Role:     * Hugh Toussaint III, MD - Primary    Assisting Surgeon: None    Pre-op Diagnosis:  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 She was given general anesthesia and intubated. She was positioned with the neck slightly extended. The patient's neck sterilely prepped and draped. A collar incision was made about 2 fingerbreadths above the clavicular head. Dissection was carried down through the skin and subcutaneous tissue. The platysma was entered. Subplatysmal flaps were raised superiorly and inferiorly. The strap muscles were encountered. The median raphae was entered. Dissection was then carried down to the surface of the thyroid. The strap muscles on the right dissected off the thyroid gland and retracted laterally. The right thyroid gland was rolled medially mobilizing the right thyroid lobe. The middle thyroid vein was located and was taken using LigaSure device. Once the gland was rotated more medially the upper and lower parathyroid glands were located as well as recurrent laryngeal nerve. Return attention to the upper pole. Vessels of the upper pole were divided using the Voyant. Then turned my attention to the lower pole the lower pole vessels were also divided with Voyant. The gland was then sharply dissected off the anterior surface of the trachea to the midline. We then turned attention to the left lobe.     The strap muscles on the left dissected off the thyroid gland and retracted laterally. The left thyroid gland was rolled medially. The upper pole was very firm. The middle thyroid vein was located and was taken using the Voyant device. Once the gland was rotated more  medially the upper and lower parathyroid glands were located as well as recurrent laryngeal nerve. Return attention to the upper pole. Vessels of the upper pole were divided using the Voyant. This was difficult as the nodule was fairly adhered to the surrounding muscle and tissue. Some muscle fibers were excised with the gland. Then turned my attention to the lower pole the lower pole vessels were also divided with Voyant. The gland was then sharply dissected off the anterior surface of the trachea to the midline. This excised and freed the gland.      The gland was submitted to pathology. Hemostasis was achieved. A sheet of Surgicel was placed in the left and the right neck. There was no evidence of bleeding. The strap muscles were reapproximated using interrupted Vicryl suture. The platysma was reapproximated using interrupted 4-0 vicryl suture. The skin was closed using 4-0 Monocryl running subcuticular suture. Incision was bandaged. She was extubated. She was brought to the recovery room in stable condition. All lap and instrument counts were correct   Complications none   Estimated Blood Loss: 20 mL    Estimated Blood Loss has been documented.         Specimens:   Specimen (24h ago, onward)       Start     Ordered    11/16/22 1015  Specimen to Pathology - Surgery  Once        Comments: Pre-op Diagnosis: Thyroid neoplasm [D49.7]Procedure(s):THYROIDECTOMY Number of specimens: 1Name of specimens: 1. Thyroid, short-left upper, long- left lower     Question:  Release to patient  Answer:  Immediate    11/16/22 1014                    [id number]

## 2024-07-22 ENCOUNTER — APPOINTMENT (OUTPATIENT)
Dept: CT IMAGING | Age: 69
End: 2024-07-22
Payer: MEDICARE

## 2024-07-22 ENCOUNTER — APPOINTMENT (OUTPATIENT)
Dept: ULTRASOUND IMAGING | Age: 69
End: 2024-07-22
Payer: MEDICARE

## 2024-07-22 ENCOUNTER — HOSPITAL ENCOUNTER (INPATIENT)
Age: 69
LOS: 4 days | Discharge: NURSING, REHAB OR LONG-TERM CARE | End: 2024-07-26
Attending: EMERGENCY MEDICINE | Admitting: INTERNAL MEDICINE
Payer: MEDICARE

## 2024-07-22 ENCOUNTER — APPOINTMENT (OUTPATIENT)
Dept: GENERAL RADIOLOGY | Age: 69
End: 2024-07-22
Payer: MEDICARE

## 2024-07-22 DIAGNOSIS — I42.9 CARDIOMYOPATHY, UNSPECIFIED TYPE (HCC): ICD-10-CM
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
## 2024-07-22 DIAGNOSIS — R29.898 LEG WEAKNESS, BILATERAL: ICD-10-CM

## 2024-07-22 PROBLEM — R53.1 GENERALIZED WEAKNESS: Status: ACTIVE | Noted: 2024-07-22

## 2024-07-22 LAB
ALBUMIN SERPL-MCNC: 3.5 G/DL (ref 3.4–5)
ALBUMIN/GLOB SERPL: 1 {RATIO} (ref 1.1–2.2)
ALP SERPL-CCNC: 102 U/L (ref 40–129)
ALT SERPL-CCNC: 13 U/L (ref 10–40)
ANION GAP SERPL CALCULATED.3IONS-SCNC: 19 MMOL/L (ref 3–16)
APTT BLD: 27.9 SEC (ref 22.1–36.4)
AST SERPL-CCNC: 67 U/L (ref 15–37)
BACTERIA URNS QL MICRO: NORMAL /HPF
BASOPHILS # BLD: 0 K/UL (ref 0–0.2)
BASOPHILS NFR BLD: 0.6 %
BILIRUB DIRECT SERPL-MCNC: 1.7 MG/DL (ref 0–0.3)
BILIRUB INDIRECT SERPL-MCNC: 1.2 MG/DL (ref 0–1)
BILIRUB SERPL-MCNC: 2.9 MG/DL (ref 0–1)
BILIRUB SERPL-MCNC: 3 MG/DL (ref 0–1)
BILIRUB UR QL STRIP.AUTO: ABNORMAL
BUN SERPL-MCNC: 9 MG/DL (ref 7–20)
CALCIUM SERPL-MCNC: 9.3 MG/DL (ref 8.3–10.6)
CHLORIDE SERPL-SCNC: 87 MMOL/L (ref 99–110)
CK SERPL-CCNC: 488 U/L (ref 39–308)
CLARITY UR: CLEAR
CO2 SERPL-SCNC: 29 MMOL/L (ref 21–32)
COLOR UR: ABNORMAL
CREAT SERPL-MCNC: 0.6 MG/DL (ref 0.8–1.3)
DEPRECATED RDW RBC AUTO: 17.1 % (ref 12.4–15.4)
EKG ATRIAL RATE: 93 BPM
EKG DIAGNOSIS: NORMAL
EKG P AXIS: 54 DEGREES
EKG P-R INTERVAL: 162 MS
EKG Q-T INTERVAL: 382 MS
EKG QRS DURATION: 94 MS
EKG QTC CALCULATION (BAZETT): 474 MS
EKG R AXIS: -10 DEGREES
EKG T AXIS: 150 DEGREES
EKG VENTRICULAR RATE: 93 BPM
EOSINOPHIL # BLD: 0 K/UL (ref 0–0.6)
EOSINOPHIL NFR BLD: 0.2 %
EPI CELLS #/AREA URNS AUTO: 1 /HPF (ref 0–5)
FERRITIN SERPL IA-MCNC: 7985 NG/ML (ref 30–400)
FOLATE SERPL-MCNC: 5.49 NG/ML (ref 4.78–24.2)
GFR SERPLBLD CREATININE-BSD FMLA CKD-EPI: >90 ML/MIN/{1.73_M2}
GLUCOSE SERPL-MCNC: 135 MG/DL (ref 70–99)
GLUCOSE UR STRIP.AUTO-MCNC: NEGATIVE MG/DL
HAV IGM SERPL QL IA: NORMAL
HBV CORE IGM SERPL QL IA: NORMAL
HBV SURFACE AG SERPL QL IA: NORMAL
HCT VFR BLD AUTO: 36.4 % (ref 40.5–52.5)
HCV AB SERPL QL IA: NORMAL
HGB BLD-MCNC: 12.7 G/DL (ref 13.5–17.5)
HGB UR QL STRIP.AUTO: NEGATIVE
HYALINE CASTS #/AREA URNS AUTO: 1 /LPF (ref 0–8)
INR PPP: 1.48 (ref 0.85–1.15)
IRON SATN MFR SERPL: 94 % (ref 20–50)
IRON SERPL-MCNC: 195 UG/DL (ref 59–158)
KETONES UR STRIP.AUTO-MCNC: NEGATIVE MG/DL
LACTATE BLDV-SCNC: 3.1 MMOL/L (ref 0.4–1.9)
LACTATE BLDV-SCNC: 3.6 MMOL/L (ref 0.4–1.9)
LEUKOCYTE ESTERASE UR QL STRIP.AUTO: ABNORMAL
LIPASE SERPL-CCNC: 37 U/L (ref 13–60)
LYMPHOCYTES # BLD: 1 K/UL (ref 1–5.1)
LYMPHOCYTES NFR BLD: 20.8 %
MAGNESIUM SERPL-MCNC: 1.5 MG/DL (ref 1.8–2.4)
MCH RBC QN AUTO: 47.2 PG (ref 26–34)
MCHC RBC AUTO-ENTMCNC: 34.9 G/DL (ref 31–36)
MCV RBC AUTO: 135.4 FL (ref 80–100)
MONOCYTES # BLD: 0.3 K/UL (ref 0–1.3)
MONOCYTES NFR BLD: 5.6 %
NEUTROPHILS # BLD: 3.3 K/UL (ref 1.7–7.7)
NEUTROPHILS NFR BLD: 72.8 %
NITRITE UR QL STRIP.AUTO: NEGATIVE
NT-PROBNP SERPL-MCNC: 155 PG/ML (ref 0–124)
PATH INTERP BLD-IMP: NO
PH UR STRIP.AUTO: 6 [PH] (ref 5–8)
PLATELET # BLD AUTO: 205 K/UL (ref 135–450)
PMV BLD AUTO: 9.9 FL (ref 5–10.5)
POTASSIUM SERPL-SCNC: 3 MMOL/L (ref 3.5–5.1)
PROCALCITONIN SERPL IA-MCNC: 15.51 NG/ML (ref 0–0.15)
PROT SERPL-MCNC: 7 G/DL (ref 6.4–8.2)
PROT UR STRIP.AUTO-MCNC: NEGATIVE MG/DL
PROTHROMBIN TIME: 18.1 SEC (ref 11.9–14.9)
RBC # BLD AUTO: 2.69 M/UL (ref 4.2–5.9)
RBC CLUMPS #/AREA URNS AUTO: 0 /HPF (ref 0–4)
SODIUM SERPL-SCNC: 135 MMOL/L (ref 136–145)
SP GR UR STRIP.AUTO: 1.01 (ref 1–1.03)
TIBC SERPL-MCNC: 207 UG/DL (ref 260–445)
TROPONIN, HIGH SENSITIVITY: 13 NG/L (ref 0–22)
TROPONIN, HIGH SENSITIVITY: 19 NG/L (ref 0–22)
TSH SERPL DL<=0.005 MIU/L-ACNC: 2.31 UIU/ML (ref 0.27–4.2)
UA COMPLETE W REFLEX CULTURE PNL UR: ABNORMAL
UA DIPSTICK W REFLEX MICRO PNL UR: YES
URN SPEC COLLECT METH UR: ABNORMAL
UROBILINOGEN UR STRIP-ACNC: >=8 E.U./DL
VIT B12 SERPL-MCNC: 705 PG/ML (ref 211–911)
WBC # BLD AUTO: 4.6 K/UL (ref 4–11)
WBC #/AREA URNS AUTO: 1 /HPF (ref 0–5)

## 2024-07-22 PROCEDURE — 96367 TX/PROPH/DG ADDL SEQ IV INF: CPT

## 2024-07-22 PROCEDURE — 99285 EMERGENCY DEPT VISIT HI MDM: CPT

## 2024-07-22 PROCEDURE — 6360000004 HC RX CONTRAST MEDICATION: Performed by: SURGERY

## 2024-07-22 PROCEDURE — 83880 ASSAY OF NATRIURETIC PEPTIDE: CPT

## 2024-07-22 PROCEDURE — 83550 IRON BINDING TEST: CPT

## 2024-07-22 PROCEDURE — 96361 HYDRATE IV INFUSION ADD-ON: CPT

## 2024-07-22 PROCEDURE — 93010 ELECTROCARDIOGRAM REPORT: CPT | Performed by: INTERNAL MEDICINE

## 2024-07-22 PROCEDURE — 87040 BLOOD CULTURE FOR BACTERIA: CPT

## 2024-07-22 PROCEDURE — 84443 ASSAY THYROID STIM HORMONE: CPT

## 2024-07-22 PROCEDURE — 99222 1ST HOSP IP/OBS MODERATE 55: CPT | Performed by: SURGERY

## 2024-07-22 PROCEDURE — 6370000000 HC RX 637 (ALT 250 FOR IP): Performed by: EMERGENCY MEDICINE

## 2024-07-22 PROCEDURE — 86038 ANTINUCLEAR ANTIBODIES: CPT

## 2024-07-22 PROCEDURE — 83605 ASSAY OF LACTIC ACID: CPT

## 2024-07-22 PROCEDURE — 85730 THROMBOPLASTIN TIME PARTIAL: CPT

## 2024-07-22 PROCEDURE — 1200000000 HC SEMI PRIVATE

## 2024-07-22 PROCEDURE — 6370000000 HC RX 637 (ALT 250 FOR IP): Performed by: INTERNAL MEDICINE

## 2024-07-22 PROCEDURE — 80074 ACUTE HEPATITIS PANEL: CPT

## 2024-07-22 PROCEDURE — 82248 BILIRUBIN DIRECT: CPT

## 2024-07-22 PROCEDURE — 36415 COLL VENOUS BLD VENIPUNCTURE: CPT

## 2024-07-22 PROCEDURE — 82728 ASSAY OF FERRITIN: CPT

## 2024-07-22 PROCEDURE — 96365 THER/PROPH/DIAG IV INF INIT: CPT

## 2024-07-22 PROCEDURE — 83540 ASSAY OF IRON: CPT

## 2024-07-22 PROCEDURE — 85610 PROTHROMBIN TIME: CPT

## 2024-07-22 PROCEDURE — 6360000002 HC RX W HCPCS: Performed by: EMERGENCY MEDICINE

## 2024-07-22 PROCEDURE — 82390 ASSAY OF CERULOPLASMIN: CPT

## 2024-07-22 PROCEDURE — 72125 CT NECK SPINE W/O DYE: CPT

## 2024-07-22 PROCEDURE — 83690 ASSAY OF LIPASE: CPT

## 2024-07-22 PROCEDURE — 86015 ACTIN ANTIBODY EACH: CPT

## 2024-07-22 PROCEDURE — 2580000003 HC RX 258: Performed by: INTERNAL MEDICINE

## 2024-07-22 PROCEDURE — 93005 ELECTROCARDIOGRAM TRACING: CPT | Performed by: EMERGENCY MEDICINE

## 2024-07-22 PROCEDURE — 6360000002 HC RX W HCPCS: Performed by: INTERNAL MEDICINE

## 2024-07-22 PROCEDURE — 82550 ASSAY OF CK (CPK): CPT

## 2024-07-22 PROCEDURE — 82746 ASSAY OF FOLIC ACID SERUM: CPT

## 2024-07-22 PROCEDURE — 71045 X-RAY EXAM CHEST 1 VIEW: CPT

## 2024-07-22 PROCEDURE — 82607 VITAMIN B-12: CPT

## 2024-07-22 PROCEDURE — 84145 PROCALCITONIN (PCT): CPT

## 2024-07-22 PROCEDURE — 72131 CT LUMBAR SPINE W/O DYE: CPT

## 2024-07-22 PROCEDURE — 81001 URINALYSIS AUTO W/SCOPE: CPT

## 2024-07-22 PROCEDURE — 85025 COMPLETE CBC W/AUTO DIFF WBC: CPT

## 2024-07-22 PROCEDURE — 83516 IMMUNOASSAY NONANTIBODY: CPT

## 2024-07-22 PROCEDURE — 80053 COMPREHEN METABOLIC PANEL: CPT

## 2024-07-22 PROCEDURE — 76705 ECHO EXAM OF ABDOMEN: CPT

## 2024-07-22 PROCEDURE — 70450 CT HEAD/BRAIN W/O DYE: CPT

## 2024-07-22 PROCEDURE — 83735 ASSAY OF MAGNESIUM: CPT

## 2024-07-22 PROCEDURE — 84484 ASSAY OF TROPONIN QUANT: CPT

## 2024-07-22 PROCEDURE — APPNB30 APP NON BILLABLE TIME 0-30 MINS: Performed by: NURSE PRACTITIONER

## 2024-07-22 PROCEDURE — 74177 CT ABD & PELVIS W/CONTRAST: CPT

## 2024-07-22 PROCEDURE — 82103 ALPHA-1-ANTITRYPSIN TOTAL: CPT

## 2024-07-22 PROCEDURE — 82247 BILIRUBIN TOTAL: CPT

## 2024-07-22 PROCEDURE — 2580000003 HC RX 258: Performed by: EMERGENCY MEDICINE

## 2024-07-22 PROCEDURE — 72128 CT CHEST SPINE W/O DYE: CPT

## 2024-07-22 PROCEDURE — 82104 ALPHA-1-ANTITRYPSIN PHENO: CPT

## 2024-07-22 RX ORDER — LORAZEPAM 1 MG/1
4 TABLET ORAL
Status: DISCONTINUED | OUTPATIENT
Start: 2024-07-22 | End: 2024-07-26 | Stop reason: HOSPADM

## 2024-07-22 RX ORDER — GAUZE BANDAGE 2" X 2"
100 BANDAGE TOPICAL DAILY
Status: DISCONTINUED | OUTPATIENT
Start: 2024-07-22 | End: 2024-07-26 | Stop reason: HOSPADM

## 2024-07-22 RX ORDER — AMLODIPINE BESYLATE 5 MG/1
5 TABLET ORAL DAILY
Status: DISCONTINUED | OUTPATIENT
Start: 2024-07-22 | End: 2024-07-26 | Stop reason: HOSPADM

## 2024-07-22 RX ORDER — SODIUM CHLORIDE, SODIUM LACTATE, POTASSIUM CHLORIDE, AND CALCIUM CHLORIDE .6; .31; .03; .02 G/100ML; G/100ML; G/100ML; G/100ML
30 INJECTION, SOLUTION INTRAVENOUS ONCE
Status: COMPLETED | OUTPATIENT
Start: 2024-07-22 | End: 2024-07-22

## 2024-07-22 RX ORDER — SODIUM CHLORIDE 0.9 % (FLUSH) 0.9 %
5-40 SYRINGE (ML) INJECTION PRN
Status: DISCONTINUED | OUTPATIENT
Start: 2024-07-22 | End: 2024-07-26 | Stop reason: HOSPADM

## 2024-07-22 RX ORDER — LISINOPRIL 5 MG/1
2.5 TABLET ORAL DAILY
Status: DISCONTINUED | OUTPATIENT
Start: 2024-07-22 | End: 2024-07-26 | Stop reason: HOSPADM

## 2024-07-22 RX ORDER — MAGNESIUM SULFATE IN WATER 40 MG/ML
2000 INJECTION, SOLUTION INTRAVENOUS ONCE
Status: COMPLETED | OUTPATIENT
Start: 2024-07-22 | End: 2024-07-22

## 2024-07-22 RX ORDER — POTASSIUM CHLORIDE 20 MEQ/1
40 TABLET, EXTENDED RELEASE ORAL ONCE
Status: COMPLETED | OUTPATIENT
Start: 2024-07-22 | End: 2024-07-22

## 2024-07-22 RX ORDER — PANTOPRAZOLE SODIUM 40 MG/1
40 TABLET, DELAYED RELEASE ORAL
Status: DISCONTINUED | OUTPATIENT
Start: 2024-07-23 | End: 2024-07-22

## 2024-07-22 RX ORDER — LANOLIN ALCOHOL/MO/W.PET/CERES
400 CREAM (GRAM) TOPICAL ONCE
Status: COMPLETED | OUTPATIENT
Start: 2024-07-22 | End: 2024-07-22

## 2024-07-22 RX ORDER — ACETAMINOPHEN 650 MG/1
650 SUPPOSITORY RECTAL EVERY 6 HOURS PRN
Status: DISCONTINUED | OUTPATIENT
Start: 2024-07-22 | End: 2024-07-23

## 2024-07-22 RX ORDER — LORAZEPAM 1 MG/1
3 TABLET ORAL
Status: DISCONTINUED | OUTPATIENT
Start: 2024-07-22 | End: 2024-07-26 | Stop reason: HOSPADM

## 2024-07-22 RX ORDER — LORAZEPAM 1 MG/1
2 TABLET ORAL
Status: DISCONTINUED | OUTPATIENT
Start: 2024-07-22 | End: 2024-07-26 | Stop reason: HOSPADM

## 2024-07-22 RX ORDER — POLYETHYLENE GLYCOL 3350 17 G/17G
17 POWDER, FOR SOLUTION ORAL DAILY PRN
Status: DISCONTINUED | OUTPATIENT
Start: 2024-07-22 | End: 2024-07-26 | Stop reason: HOSPADM

## 2024-07-22 RX ORDER — ASPIRIN 81 MG/1
81 TABLET ORAL DAILY
Status: DISCONTINUED | OUTPATIENT
Start: 2024-07-22 | End: 2024-07-26 | Stop reason: HOSPADM

## 2024-07-22 RX ORDER — SODIUM CHLORIDE 0.9 % (FLUSH) 0.9 %
5-40 SYRINGE (ML) INJECTION EVERY 12 HOURS SCHEDULED
Status: DISCONTINUED | OUTPATIENT
Start: 2024-07-22 | End: 2024-07-26 | Stop reason: HOSPADM

## 2024-07-22 RX ORDER — SODIUM CHLORIDE, SODIUM LACTATE, POTASSIUM CHLORIDE, CALCIUM CHLORIDE 600; 310; 30; 20 MG/100ML; MG/100ML; MG/100ML; MG/100ML
INJECTION, SOLUTION INTRAVENOUS CONTINUOUS
Status: DISCONTINUED | OUTPATIENT
Start: 2024-07-22 | End: 2024-07-26

## 2024-07-22 RX ORDER — SODIUM CHLORIDE 9 MG/ML
INJECTION, SOLUTION INTRAVENOUS PRN
Status: DISCONTINUED | OUTPATIENT
Start: 2024-07-22 | End: 2024-07-26 | Stop reason: HOSPADM

## 2024-07-22 RX ORDER — METOPROLOL TARTRATE 50 MG/1
50 TABLET, FILM COATED ORAL 2 TIMES DAILY
Status: DISCONTINUED | OUTPATIENT
Start: 2024-07-22 | End: 2024-07-26 | Stop reason: HOSPADM

## 2024-07-22 RX ORDER — ENOXAPARIN SODIUM 100 MG/ML
40 INJECTION SUBCUTANEOUS DAILY
Status: DISCONTINUED | OUTPATIENT
Start: 2024-07-23 | End: 2024-07-26 | Stop reason: HOSPADM

## 2024-07-22 RX ORDER — PRAVASTATIN SODIUM 40 MG
40 TABLET ORAL NIGHTLY
COMMUNITY
Start: 2024-04-26

## 2024-07-22 RX ORDER — ACETAMINOPHEN 325 MG/1
650 TABLET ORAL ONCE
Status: COMPLETED | OUTPATIENT
Start: 2024-07-22 | End: 2024-07-22

## 2024-07-22 RX ORDER — OMEPRAZOLE 20 MG/1
20 CAPSULE, DELAYED RELEASE ORAL DAILY
COMMUNITY

## 2024-07-22 RX ORDER — ACETAMINOPHEN 325 MG/1
650 TABLET ORAL EVERY 6 HOURS PRN
Status: DISCONTINUED | OUTPATIENT
Start: 2024-07-22 | End: 2024-07-23

## 2024-07-22 RX ORDER — FENOFIBRATE 160 MG/1
160 TABLET ORAL DAILY
Status: DISCONTINUED | OUTPATIENT
Start: 2024-07-22 | End: 2024-07-26 | Stop reason: HOSPADM

## 2024-07-22 RX ORDER — LORAZEPAM 1 MG/1
1 TABLET ORAL
Status: DISCONTINUED | OUTPATIENT
Start: 2024-07-22 | End: 2024-07-26 | Stop reason: HOSPADM

## 2024-07-22 RX ORDER — PRAVASTATIN SODIUM 20 MG
40 TABLET ORAL NIGHTLY
Status: DISCONTINUED | OUTPATIENT
Start: 2024-07-22 | End: 2024-07-26 | Stop reason: HOSPADM

## 2024-07-22 RX ADMIN — Medication 100 MG: at 16:01

## 2024-07-22 RX ADMIN — POTASSIUM CHLORIDE 40 MEQ: 1500 TABLET, EXTENDED RELEASE ORAL at 07:31

## 2024-07-22 RX ADMIN — Medication 10 ML: at 20:05

## 2024-07-22 RX ADMIN — ACETAMINOPHEN 650 MG: 325 TABLET ORAL at 10:57

## 2024-07-22 RX ADMIN — SODIUM CHLORIDE, POTASSIUM CHLORIDE, SODIUM LACTATE AND CALCIUM CHLORIDE: 600; 310; 30; 20 INJECTION, SOLUTION INTRAVENOUS at 15:43

## 2024-07-22 RX ADMIN — ACETAMINOPHEN 650 MG: 325 TABLET ORAL at 17:54

## 2024-07-22 RX ADMIN — LISINOPRIL 2.5 MG: 5 TABLET ORAL at 16:01

## 2024-07-22 RX ADMIN — PRAVASTATIN SODIUM 40 MG: 20 TABLET ORAL at 20:05

## 2024-07-22 RX ADMIN — MAGNESIUM SULFATE HEPTAHYDRATE 2000 MG: 40 INJECTION, SOLUTION INTRAVENOUS at 16:01

## 2024-07-22 RX ADMIN — CEFEPIME 2000 MG: 2 INJECTION, POWDER, FOR SOLUTION INTRAVENOUS at 09:50

## 2024-07-22 RX ADMIN — VANCOMYCIN HYDROCHLORIDE 2000 MG: 10 INJECTION, POWDER, LYOPHILIZED, FOR SOLUTION INTRAVENOUS at 11:46

## 2024-07-22 RX ADMIN — FENOFIBRATE 160 MG: 160 TABLET ORAL at 16:01

## 2024-07-22 RX ADMIN — Medication 400 MG: at 07:31

## 2024-07-22 RX ADMIN — SODIUM CHLORIDE, POTASSIUM CHLORIDE, SODIUM LACTATE AND CALCIUM CHLORIDE 2655 ML: 600; 310; 30; 20 INJECTION, SOLUTION INTRAVENOUS at 08:10

## 2024-07-22 RX ADMIN — AMLODIPINE BESYLATE 5 MG: 5 TABLET ORAL at 16:01

## 2024-07-22 RX ADMIN — ASPIRIN 81 MG: 81 TABLET, COATED ORAL at 16:01

## 2024-07-22 RX ADMIN — IOPAMIDOL 75 ML: 755 INJECTION, SOLUTION INTRAVENOUS at 13:29

## 2024-07-22 RX ADMIN — METOPROLOL TARTRATE 50 MG: 50 TABLET, FILM COATED ORAL at 20:05

## 2024-07-22 ASSESSMENT — PAIN DESCRIPTION - ORIENTATION
ORIENTATION: RIGHT;LEFT
ORIENTATION: RIGHT;LEFT

## 2024-07-22 ASSESSMENT — LIFESTYLE VARIABLES
HOW MANY STANDARD DRINKS CONTAINING ALCOHOL DO YOU HAVE ON A TYPICAL DAY: 10 OR MORE
HOW OFTEN DO YOU HAVE A DRINK CONTAINING ALCOHOL: 4 OR MORE TIMES A WEEK

## 2024-07-22 ASSESSMENT — PAIN - FUNCTIONAL ASSESSMENT
PAIN_FUNCTIONAL_ASSESSMENT: ACTIVITIES ARE NOT PREVENTED
PAIN_FUNCTIONAL_ASSESSMENT: NONE - DENIES PAIN

## 2024-07-22 ASSESSMENT — PAIN DESCRIPTION - LOCATION
LOCATION: LEG
LOCATION: LEG

## 2024-07-22 ASSESSMENT — PAIN SCALES - GENERAL
PAINLEVEL_OUTOF10: 2
PAINLEVEL_OUTOF10: 3

## 2024-07-22 ASSESSMENT — PAIN DESCRIPTION - DESCRIPTORS: DESCRIPTORS: ACHING

## 2024-07-22 NOTE — PROGRESS NOTES
Message sent to Dr. Montoya about the patient's left great toe. New orders. Consult placed to podiatry.

## 2024-07-22 NOTE — CONSULTS
[facility]     Consult                                                     Patient Name: Kevin Brown  MRN: [id number]  YOB: 1955  Admission date: 7/22/2024  5:57 AM   Date of evaluation: 7/22/2024  Primary Care Physician: Derek Griffith MD  Reason for consult:  gallbladder sludge  History of Present Illness:    Mr. Brown is a 68 y.o. male who presents with bilateral leg weakness for weeks, multiple falls. Denies abdominal pain, nausea, vomiting or post prandial symptoms. Admits to anorexia and 50lb weight loss over the last year. Family told patient he was jaundiced but has actually improved over last few days. Admits to drinking 8-10 shots of liquor daily for at least 10 years. ED workup showed elevated bilirubin, lactic acid. Follow up ultrasound showed normal biliary tree but gallbladder sludge and surgery consulted. Last stool 6 days ago and non-bloody. Denies fevers, chills, chest pain, dyspnea, dysuria or other complaints. EGD/colonoscopy for anemia 12/2023 with Dr. Freire negative. At the time was on plavix for CAD and stent placement 6/2023    Past Medical History:        Diagnosis Date    CAD (coronary artery disease)     Equilibrium disorder     Hyperlipidemia     Hypertension     Old MI (myocardial infarction)        Past Surgical History:        Procedure Laterality Date    CARDIAC CATHETERIZATION      COLONOSCOPY N/A 12/19/2023    COLONOSCOPY DIAGNOSTIC performed by Fadi Freire MD at [facility] ENDOSCOPY    CORONARY ANGIOPLASTY WITH STENT PLACEMENT      UPPER GASTROINTESTINAL ENDOSCOPY N/A 12/18/2023    EGD BIOPSY performed by Fadi Freire MD at [facility] ENDOSCOPY       Scheduled Meds:   cefepime  2,000 mg IntraVENous Q12H    vancomycin  2,000 mg IntraVENous Once    magnesium sulfate  2,000 mg IntraVENous Once    sodium chloride flush  5-40 mL IntraVENous 2 times per day    thiamine  100 mg Oral Daily     Continuous Infusions:   sodium chloride       reconstruction, and/or weight based adjustment of the mA/kV was utilized to reduce the radiation dose to as low as reasonably achievable.; CT of the lumbar spine was performed without the administration of intravenous contrast. Multiplanar reformatted images are provided for review.  Adjustment of mA and/or kV according to patient size was utilized.  Automated exposure control, iterative reconstruction, and/or weight based adjustment of the mA/kV was utilized to reduce the radiation dose to as low as reasonably achievable. COMPARISON: None. HISTORY: ORDERING SYSTEM PROVIDED HISTORY: fall, injury TECHNOLOGIST PROVIDED HISTORY: Reason for exam:->fall, injury Reason for Exam: fall, injury Relevant Medical/Surgical History: Extremity Weakness (Pt presents to the ED with c/o bilateral leg weakness that started about 2 weeks ago, pt states he fell yesterday, denies hitting head or loc.  States he has to use a cane to ambulate now.); ORDERING SYSTEM PROVIDED HISTORY: fall, injury TECHNOLOGIST PROVIDED HISTORY: Reason for exam:->fall, injury Decision Support Exception - unselect if not a suspected or confirmed emergency medical condition->Emergency Medical Condition (MA) Reason for Exam: fall, injury Relevant Medical/Surgical History: Extremity Weakness (Pt presents to the ED with c/o bilateral leg weakness that started about 2 weeks ago, pt states he fell yesterday, denies hitting head or loc.  States he has to use a cane to ambulate now.) FINDINGS: BONES/ALIGNMENT: There is no acute fracture or traumatic malalignment. DEGENERATIVE CHANGES: Mild multilevel degenerative disc disease is present throughout the thoracic spine.  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  There is significant bilateral foraminal encroachment at this level. SOFT TISSUES: No paraspinal mass is seen.     No acute thoracic or lumbar spine trauma.     CT THORACIC SPINE

## 2024-07-22 NOTE — ED TRIAGE NOTES
Pt presents to the ED with c/o bilateral leg weakness that started about 2 weeks ago, pt states he fell yesterday, denies hitting head or loc.  States he has to use a cane to ambulate now.

## 2024-07-22 NOTE — PROGRESS NOTES
4 Eyes Admission Assessment     I agree as the admission nurse that 2 RN's have performed a thorough Head to Toe Skin Assessment on the patient. ALL assessment sites listed below have been assessed on admission. Scattered bruising. Great toe on Left foot nail is bleeding and looks like it going to come off.      Areas assessed by both nurses: all  [x]   Head, Face, and Ears   [x]   Shoulders, Back, and Chest  [x]   Arms, Elbows, and Hands   [x]   Coccyx, Sacrum, and Ischum  [x]   Legs, Feet, and Heels        Does the Patient have Skin Breakdown?  No         Sergio Prevention initiated:  No   Wound Care Orders initiated:  No      WOC nurse consulted for Pressure Injury (Stage 3,4, Unstageable, DTI, NWPT, and Complex wounds):  No      Nurse 1 eSignature: Electronically signed by Maria De Jesus Charlton RN on 7/22/24 at 6:05 PM EDT    **SHARE this note so that the co-signing nurse is able to place an eSignature**    Nurse 2 eSignature: Electronically signed by Sheila Aguilera RN on 7/22/24 at 6:46 PM EDT

## 2024-07-22 NOTE — CONSULTS
Gastroenterology Consult Note        Patient: Kevin Brown  : 1955  Acct#:      Date:  2024      No diagnosis found.    Subjective:       History of Present Illness  Patient is a 68 y.o.  male admitted with No admission diagnoses are documented for this encounter. who is seen in consult for elevated bili.   H/o CAD with PCI 2023 on ASA 81 mg daily.    He was seen by our group in 2023 for anemia. EGD  with 1 to 2 cm hiatal hernia, gastritis.  No source of anemia.  Colonoscopy with large internal hemorrhoids and diverticulosis.  No source of anemia.  He was found to be folate deficient and took a course of folic acid and later this was DC'd.    He had a 30 pound unintentional weight loss over the past 7 months.  Has poor appetite and decreased p.o. intake.  Has been feeling weak.  Had several falls at home.  Came to the ER for weakness and falls.  Found to have elevated lactic acid and procalcitonin.  Bilirubin was elevated at 3 so GI consulted.   No fevers or chills.  No abdominal pain, nausea, vomiting, melena, hematochezia.  He is compliant with daily omeprazole.  No herbals or over-the-counter meds.  No recent antibiotics.    Past Medical History:   Diagnosis Date    CAD (coronary artery disease)     Equilibrium disorder     Hyperlipidemia     Hypertension     Old MI (myocardial infarction)       Past Surgical History:   Procedure Laterality Date    CARDIAC CATHETERIZATION      COLONOSCOPY N/A 2023    COLONOSCOPY DIAGNOSTIC performed by Fadi Freire MD at [facility] ENDOSCOPY    CORONARY ANGIOPLASTY WITH STENT PLACEMENT      UPPER GASTROINTESTINAL ENDOSCOPY N/A 2023    EGD BIOPSY performed by Fadi Freire MD at [facility] ENDOSCOPY      Past Endoscopic History    Procedure: Esophagogastroduodenoscopy with biopsy     Date:  2023      Surgeon:  Fadi Freire MD      Referring Physician:  Derek Griffith MD     Preoperative Diagnosis:  Anemia, unspecified type      Tobacco Use    Smoking status: Former     Types: Cigarettes    Smokeless tobacco: Never    Tobacco comments:     quit many years ago   Substance Use Topics    Alcohol use: Yes     Comment: moderate        Family History   Problem Relation Age of Onset    Heart Disease Mother     Heart Disease Brother     Heart Disease Brother     Heart Disease Father     Heart Disease Paternal Uncle                 Physical Exam  Blood pressure 125/71, pulse 88, temperature 98.2 °F (36.8 °C), temperature source Oral, resp. rate 21, height 1.803 m (5' 11\"), weight 88.5 kg (195 lb), SpO2 95 %.    General appearance: alert, cooperative, no distress, appears stated age  Eyes: Anicteric  Head: Normocephalic, without obvious abnormality  Lungs: clear to auscultation bilaterally, Normal Effort  Heart: regular rate and rhythm, normal S1 and S2, no murmurs or rubs  Abdomen: soft, non-tender. Bowel sounds normal. No masses,  no organomegaly.   Extremities: atraumatic, no cyanosis or edema  Skin: warm and dry, no jaundice  Neuro: Grossly intact, A&OX3  Musculoskeletal: 5/5  strength BUE      Data Review:    Recent Labs     07/22/24  0627   WBC 4.6   HGB 12.7*   HCT 36.4*   .4*        Recent Labs     07/22/24  0627   *   K 3.0*   CL 87*   CO2 29   BUN 9   CREATININE 0.6*     Recent Labs     07/22/24  0627   AST 67*   ALT 13   BILITOT 3.0*   ALKPHOS 102     Recent Labs     07/22/24  0627   LIPASE 37.0     Recent Labs     07/22/24  0627   PROTIME 18.1*   INR 1.48*     Invalid input(s): \"PTT\"  No results for input(s): \"OCCULTBLD\" in the last 72 hours.    Imaging Studies:                            Ultrasound 7/22/24    FINDINGS:  LIVER:  The liver is diffusely echogenic in appearance, suggestive of hepatic  steatosis.  However, there is no discrete intrahepatic nodule or mass.  There  is no evidence of intrahepatic biliary ductal dilatation.     BILIARY SYSTEM:  There is sludge within the gallbladder, without evidence

## 2024-07-22 NOTE — PROGRESS NOTES
Patient arrived to unit from ED and was placed into room 5580. Patient A&O x 4. Patient oriented to room, unit routine, call light/telephone use, bed/chair alarm implementation, and meal ordering process. Patient reports understanding, denies questions. Patient denies physical/emotional needs at this time. Call light, telephone, and bed side table are within reach. Fall precautions in place. Wife at bedside

## 2024-07-22 NOTE — ED PROVIDER NOTES
membranes.  Eyes: Anicteric sclera. No discharge.  Pale conjunctiva.  Extraocular movements intact.  Neck: Supple. Trachea midline.  Tender to palpation in the mid cervical spine without palpable step-off or deformity.  Cardiovascular: Tachycardic  Pulmonary/Chest: Effort normal. No respiratory distress. CTAB. Chest wall non-tender to palpation  Abdominal: Soft. No distension. Non-tender to palpation.  Musculoskeletal: All bones and joints in the extremities were palpated and inspected without tenderness or deformity.  Back: Tender to palpation in the mid and lower thoracic spine without palpable step-off or deformity.  Tender to palpation in the mid lumbar spine without palpable step-off or deformity.  Neurological: Alert and oriented to person, place, time, and situation.  Face symmetric.  Speech is clear.  Exhibits equal motor strength in all extremities.    EKG  The Ekg interpreted by me in the absence of a cardiologist shows.  normal sinus rhythm with a rate of 93  Axis is   Left axis deviation  QTc is   prolonged  Intervals and Durations are unremarkable.      Nonspecific ST segment abnormalities multiple leads, unchanged from previous.  No significant change from prior EKG dated 12/15/2023    Radiology  US ABDOMEN LIMITED Specify organ? LIVER, GALLBLADDER, PANCREAS   Final Result   1. Gallbladder sludge, without evidence of cholelithiasis or cholecystitis.   2. Diffuse hepatic steatosis.   3. Unremarkable sonographic appearance of the common bile duct, right kidney,   and visualized pancreas.         XR CHEST PORTABLE   Final Result   No acute process.         CT THORACIC SPINE WO CONTRAST   Final Result   No acute thoracic or lumbar spine trauma.         CT LUMBAR SPINE WO CONTRAST   Final Result   No acute thoracic or lumbar spine trauma.         CT CERVICAL SPINE WO CONTRAST   Final Result   No acute abnormality of the cervical spine.         CT HEAD WO CONTRAST   Final Result   No acute intracranial  Sepsis 3.6 (H) 0.4 - 1.9 mmol/L   Lactate, Sepsis   Result Value Ref Range    Lactic Acid, Sepsis 3.1 (H) 0.4 - 1.9 mmol/L   CK   Result Value Ref Range    Total  (H) 39 - 308 U/L   Procalcitonin   Result Value Ref Range    Procalcitonin 15.51 (H) 0.00 - 0.15 ng/mL   Magnesium   Result Value Ref Range    Magnesium 1.50 (L) 1.80 - 2.40 mg/dL   Lipase   Result Value Ref Range    Lipase 37.0 13.0 - 60.0 U/L   Urinalysis with Reflex to Culture    Specimen: Urine   Result Value Ref Range    Color, UA DARK YELLOW (A) Straw/Yellow    Clarity, UA Clear Clear    Glucose, Ur Negative Negative mg/dL    Bilirubin, Urine SMALL (A) Negative    Ketones, Urine Negative Negative mg/dL    Specific Gravity, UA 1.015 1.005 - 1.030    Blood, Urine Negative Negative    pH, Urine 6.0 5.0 - 8.0    Protein, UA Negative Negative mg/dL    Urobilinogen, Urine >=8.0 (A) <2.0 E.U./dL    Nitrite, Urine Negative Negative    Leukocyte Esterase, Urine TRACE (A) Negative    Microscopic Examination YES     Urine Type Voided     Urine Reflex to Culture Not Indicated    Microscopic Urinalysis   Result Value Ref Range    Bacteria, UA None Seen None Seen /HPF    Hyaline Casts, UA 1 0 - 8 /LPF    WBC, UA 1 0 - 5 /HPF    RBC, UA 0 0 - 4 /HPF    Epithelial Cells, UA 1 0 - 5 /HPF       SEP-1  Is this patient to be included in the SEP-1 Core Measure due to severe sepsis or septic shock?   Yes   SEP-1 CORE MEASURE DATA      Sepsis Criteria   Severe Sepsis Criteria   Septic Shock Criteria     Must be confirmed or suspected to move forward with diagnosis of sepsis.    Must meet 2:    [] Temperature > 100.9 F (38.3 C)        or < 96.8 F (36 C)  [x] HR > 90  [x] RR > 20  [] WBC > 12 or < 4 or 10% bands      AND:      [x] Infection Confirmed or        Suspected.     Must meet 1:    [x] Lactate > 2       or   [x] Signs of Organ Dysfunction:    - SBP < 90 or MAP < 65  - Altered mental status  - Creatinine > 2 or increased from      baseline  - Urine Output < 0.5

## 2024-07-22 NOTE — ED NOTES
How does patient ambulate?   []Low Fall Risk (ambulates by themselves without support)  []Stand by assist   []Contact Guard   [x]Front wheel walker - Patient is high fall risk at this time even with walker  []Wheelchair   []Steady  []Bed bound  []History of Lower Extremity Amputation  []Unknown, did not assess in the emergency department   How does patient take pills?  [x]Whole with Water  []Crushed in applesauce  []Crushed in pudding  []Other  []Unknown no oral medications were given in the ED  Is patient alert?   [x]Alert  []Drowsy but responds to voice  []Doesn't respond to voice but responds to painful stimuli  []Unresponsive  Is patient oriented?   [x]To person  [x]To place  [x]To time  [x]To situation  []Confused  []Agitated  []Follows commands  If patient is disoriented or from a Skill Nursing Facility has family been notified of admission?   []Yes   [x]No  Patient belongings?   []Cell phone  []Wallet   []Dentures  [x]Clothing  Any specific patient or family belongings/needs/dynamics?     Miscellaneous comments/pending orders?      If there are any additional questions please reach out to the Emergency Department.

## 2024-07-22 NOTE — H&P
HOSPITALISTS HISTORY AND PHYSICAL    7/22/2024 12:32 PM    Patient Information:  KEVIN CA is a 68 y.o. male [id number]  PCP:  Derek Griffith MD (Tel: [phone] )    Chief complaint:    Chief Complaint   Patient presents with    Extremity Weakness     Pt presents to the ED with c/o bilateral leg weakness that started about 2 weeks ago, pt states he fell yesterday, denies hitting head or loc.  States he has to use a cane to ambulate now.     History of Present Illness:  Kevin Ca is a 68 y.o. male presents to our hospital with couple week history of progressive leg weakness and difficulty ambulating with couple falls patient [place] any chest pain lightheadedness dizziness or shortness of breath nausea vomiting with this and has not had any fever cough or sick contacts patient denies any numbness or tingling in his legs no difficulty urinating or passing stool patient does drink 8-12 shots of alcohol daily does not smoke.      REVIEW OF SYSTEMS:   Constitutional: Negative for fever,chills or night sweats  ENT: Negative for rhinorrhea, epistaxis, hoarseness, sore throat.  Respiratory: Negative for shortness of breath,wheezing  Cardiovascular: Negative for chest pain, palpitations   Gastrointestinal: Negative for nausea, vomiting, diarrhea  Genitourinary: Negative for polyuria, dysuria   Hematologic/Lymphatic: Negative for bleeding tendency, easy bruising  Musculoskeletal: Negative for myalgias and arthralgias  Skin: Negative for itching,rash  Psychiatric: Negative for depression,anxiety, agitation.  Endocrine: Negative for polydipsia,polyuria,heat /cold intolerance.    Past Medical History:   has a past medical history of CAD (coronary artery disease), Equilibrium disorder, Hyperlipidemia, Hypertension, and Old MI (myocardial infarction).     Past Surgical History:   has a past surgical history that includes Coronary  Pupils equal, muscous membranes moist, no masses appreciated  Cardiovascular: Regular rate and rhythm no murmurs appreciated  Respiratory: CTAB no wheezing  Gastrointestinal: Abdomen soft, non-tender, BS+  EXT: no edema  Neurology: no gross focal deficts  Psychiatry: Appropriate affect. Not agitated  Skin: Warm, dry, no rashes appreciated    Labs:  CBC:   Lab Results   Component Value Date/Time    WBC 4.6 07/22/2024 06:27 AM    RBC 2.69 07/22/2024 06:27 AM    HGB 12.7 07/22/2024 06:27 AM    HCT 36.4 07/22/2024 06:27 AM    .4 07/22/2024 06:27 AM    MCH 47.2 07/22/2024 06:27 AM    MCHC 34.9 07/22/2024 06:27 AM    RDW 17.1 07/22/2024 06:27 AM     07/22/2024 06:27 AM    MPV 9.9 07/22/2024 06:27 AM     BMP:    Lab Results   Component Value Date/Time     07/22/2024 06:27 AM    K 3.0 07/22/2024 06:27 AM    CL 87 07/22/2024 06:27 AM    CO2 29 07/22/2024 06:27 AM    BUN 9 07/22/2024 06:27 AM    CREATININE 0.6 07/22/2024 06:27 AM    CALCIUM 9.3 07/22/2024 06:27 AM    GFRAA >60 11/19/2021 09:32 AM    LABGLOM >90 07/22/2024 06:27 AM    LABGLOM >90 04/19/2024 09:27 AM    GLUCOSE 135 07/22/2024 06:27 AM    GLUCOSE 104 10/26/2022 01:59 PM     US ABDOMEN LIMITED Specify organ? LIVER, GALLBLADDER, PANCREAS   Final Result   1. Gallbladder sludge, without evidence of cholelithiasis or cholecystitis.   2. Diffuse hepatic steatosis.   3. Unremarkable sonographic appearance of the common bile duct, right kidney,   and visualized pancreas.         XR CHEST PORTABLE   Final Result   No acute process.         CT THORACIC SPINE WO CONTRAST   Final Result   No acute thoracic or lumbar spine trauma.         CT LUMBAR SPINE WO CONTRAST   Final Result   No acute thoracic or lumbar spine trauma.         CT CERVICAL SPINE WO CONTRAST   Final Result   No acute abnormality of the cervical spine.         CT HEAD WO CONTRAST   Final Result   No acute intracranial abnormality.         CT ABDOMEN PELVIS W IV CONTRAST Additional

## 2024-07-22 NOTE — ACP (ADVANCE CARE PLANNING)
Advanced Care Planning Note.    Purpose of Encounter: Advanced care planning in light of hospitalization  Parties In Attendance: Patient,  wife  Decisional Capacity: Yes  Subjective: Patient  understand that this conversation is to address long term care goal  Objective: Patientto the hospital with alcohol abuse  Goals of Care Determination: Patient would pursue CPR and Intubation if required. No tracheostomy or long term ventilation if required.   Code Status: full code  Time spent on Advanced care Plannin minutes  Advanced Care Planning Documents: documented patient's wishes, would like Wife Claudia to make medical decisions if unable to make decisions    Aide Montoya MD  2024 12:34 PM

## 2024-07-22 NOTE — PROGRESS NOTES
Med rec completed by admission nurse.  No other parts of admission completed yet.  Will continue as time allows.

## 2024-07-23 ENCOUNTER — APPOINTMENT (OUTPATIENT)
Dept: GENERAL RADIOLOGY | Age: 69
End: 2024-07-23
Payer: MEDICARE

## 2024-07-23 ENCOUNTER — APPOINTMENT (OUTPATIENT)
Age: 69
End: 2024-07-23
Attending: INTERNAL MEDICINE
Payer: MEDICARE

## 2024-07-23 LAB
ALBUMIN SERPL-MCNC: 2.8 G/DL (ref 3.4–5)
ALP SERPL-CCNC: 85 U/L (ref 40–129)
ALT SERPL-CCNC: 13 U/L (ref 10–40)
ANA SER QL IA: NEGATIVE
ANION GAP SERPL CALCULATED.3IONS-SCNC: 11 MMOL/L (ref 3–16)
AST SERPL-CCNC: 123 U/L (ref 15–37)
BASOPHILS # BLD: 0 K/UL (ref 0–0.2)
BASOPHILS NFR BLD: 0.8 %
BILIRUB DIRECT SERPL-MCNC: 0.8 MG/DL (ref 0–0.3)
BILIRUB INDIRECT SERPL-MCNC: 0.8 MG/DL (ref 0–1)
BILIRUB SERPL-MCNC: 1.6 MG/DL (ref 0–1)
BUN SERPL-MCNC: 8 MG/DL (ref 7–20)
CALCIUM SERPL-MCNC: 8 MG/DL (ref 8.3–10.6)
CHLORIDE SERPL-SCNC: 94 MMOL/L (ref 99–110)
CK SERPL-CCNC: 1783 U/L (ref 39–308)
CO2 SERPL-SCNC: 31 MMOL/L (ref 21–32)
CREAT SERPL-MCNC: <0.5 MG/DL (ref 0.8–1.3)
DEPRECATED RDW RBC AUTO: 17.5 % (ref 12.4–15.4)
ECHO AO ASC DIAM: 3.3 CM
ECHO AO ASCENDING AORTA INDEX: 1.58 CM/M2
ECHO AO ROOT DIAM: 3.2 CM
ECHO AO ROOT INDEX: 1.53 CM/M2
ECHO AV AREA PEAK VELOCITY: 2.9 CM2
ECHO AV AREA VTI: 2.9 CM2
ECHO AV AREA/BSA PEAK VELOCITY: 1.4 CM2/M2
ECHO AV AREA/BSA VTI: 1.4 CM2/M2
ECHO AV MEAN GRADIENT: 3 MMHG
ECHO AV MEAN VELOCITY: 0.8 M/S
ECHO AV PEAK GRADIENT: 6 MMHG
ECHO AV PEAK VELOCITY: 1.2 M/S
ECHO AV VELOCITY RATIO: 0.92
ECHO AV VTI: 21.8 CM
ECHO BSA: 2.1 M2
ECHO EST RA PRESSURE: 3 MMHG
ECHO IVC PROX: 1.6 CM
ECHO LA AREA 2C: 17.5 CM2
ECHO LA AREA 4C: 13.4 CM2
ECHO LA DIAMETER INDEX: 1.48 CM/M2
ECHO LA DIAMETER: 3.1 CM
ECHO LA MAJOR AXIS: 4.8 CM
ECHO LA MINOR AXIS: 5.8 CM
ECHO LA TO AORTIC ROOT RATIO: 0.97
ECHO LA VOL BP: 39 ML (ref 18–58)
ECHO LA VOL MOD A2C: 43 ML (ref 18–58)
ECHO LA VOL MOD A4C: 30 ML (ref 18–58)
ECHO LA VOL/BSA BIPLANE: 19 ML/M2 (ref 16–34)
ECHO LA VOLUME INDEX MOD A2C: 21 ML/M2 (ref 16–34)
ECHO LA VOLUME INDEX MOD A4C: 14 ML/M2 (ref 16–34)
ECHO LV E' LATERAL VELOCITY: 9 CM/S
ECHO LV E' SEPTAL VELOCITY: 8 CM/S
ECHO LV EDV A2C: 76 ML
ECHO LV EDV A4C: 100 ML
ECHO LV EDV INDEX A4C: 48 ML/M2
ECHO LV EDV NDEX A2C: 36 ML/M2
ECHO LV EJECTION FRACTION A2C: 56 %
ECHO LV EJECTION FRACTION A4C: 62 %
ECHO LV EJECTION FRACTION BIPLANE: 60 % (ref 55–100)
ECHO LV ESV A2C: 33 ML
ECHO LV ESV A4C: 38 ML
ECHO LV ESV INDEX A2C: 16 ML/M2
ECHO LV ESV INDEX A4C: 18 ML/M2
ECHO LV FRACTIONAL SHORTENING: 33 % (ref 28–44)
ECHO LV INTERNAL DIMENSION DIASTOLE INDEX: 2.15 CM/M2
ECHO LV INTERNAL DIMENSION DIASTOLIC: 4.5 CM (ref 4.2–5.9)
ECHO LV INTERNAL DIMENSION SYSTOLIC INDEX: 1.44 CM/M2
ECHO LV INTERNAL DIMENSION SYSTOLIC: 3 CM
ECHO LV IVSD: 0.9 CM (ref 0.6–1)
ECHO LV MASS 2D: 153.3 G (ref 88–224)
ECHO LV MASS INDEX 2D: 73.3 G/M2 (ref 49–115)
ECHO LV POSTERIOR WALL DIASTOLIC: 1.1 CM (ref 0.6–1)
ECHO LV RELATIVE WALL THICKNESS RATIO: 0.49
ECHO LVOT AREA: 3.1 CM2
ECHO LVOT AV VTI INDEX: 0.92
ECHO LVOT DIAM: 2 CM
ECHO LVOT MEAN GRADIENT: 3 MMHG
ECHO LVOT PEAK GRADIENT: 5 MMHG
ECHO LVOT PEAK VELOCITY: 1.1 M/S
ECHO LVOT STROKE VOLUME INDEX: 30 ML/M2
ECHO LVOT SV: 62.8 ML
ECHO LVOT VTI: 20 CM
ECHO MV A VELOCITY: 0.85 M/S
ECHO MV AREA VTI: 2.9 CM2
ECHO MV E DECELERATION TIME (DT): 116 MS
ECHO MV E VELOCITY: 0.81 M/S
ECHO MV E/A RATIO: 0.95
ECHO MV E/E' LATERAL: 9
ECHO MV E/E' RATIO (AVERAGED): 9.56
ECHO MV E/E' SEPTAL: 10.13
ECHO MV LVOT VTI INDEX: 1.09
ECHO MV MAX VELOCITY: 1 M/S
ECHO MV MEAN GRADIENT: 2 MMHG
ECHO MV MEAN VELOCITY: 0.7 M/S
ECHO MV PEAK GRADIENT: 4 MMHG
ECHO MV VTI: 21.8 CM
ECHO PV MAX VELOCITY: 0.9 M/S
ECHO PV PEAK GRADIENT: 3 MMHG
ECHO RA AREA 4C: 13.5 CM2
ECHO RA END SYSTOLIC VOLUME APICAL 4 CHAMBER INDEX BSA: 15 ML/M2
ECHO RA VOLUME: 31 ML
ECHO RIGHT VENTRICULAR SYSTOLIC PRESSURE (RVSP): 23 MMHG
ECHO RV BASAL DIMENSION: 3 CM
ECHO RV FREE WALL PEAK S': 13 CM/S
ECHO RV LONGITUDINAL DIMENSION: 5.5 CM
ECHO RV MID DIMENSION: 2.3 CM
ECHO RV TAPSE: 2.2 CM (ref 1.7–?)
ECHO TV REGURGITANT MAX VELOCITY: 2.23 M/S
ECHO TV REGURGITANT PEAK GRADIENT: 20 MMHG
EOSINOPHIL # BLD: 0 K/UL (ref 0–0.6)
EOSINOPHIL NFR BLD: 0.5 %
GFR SERPLBLD CREATININE-BSD FMLA CKD-EPI: >90 ML/MIN/{1.73_M2}
GLUCOSE SERPL-MCNC: 91 MG/DL (ref 70–99)
HCT VFR BLD AUTO: 30.6 % (ref 40.5–52.5)
HGB BLD-MCNC: 10.5 G/DL (ref 13.5–17.5)
LYMPHOCYTES # BLD: 0.6 K/UL (ref 1–5.1)
LYMPHOCYTES NFR BLD: 24.1 %
MAGNESIUM SERPL-MCNC: 1.9 MG/DL (ref 1.8–2.4)
MCH RBC QN AUTO: 46.9 PG (ref 26–34)
MCHC RBC AUTO-ENTMCNC: 34.5 G/DL (ref 31–36)
MCV RBC AUTO: 135.9 FL (ref 80–100)
MONOCYTES # BLD: 0.2 K/UL (ref 0–1.3)
MONOCYTES NFR BLD: 8.4 %
NEUTROPHILS # BLD: 1.7 K/UL (ref 1.7–7.7)
NEUTROPHILS NFR BLD: 66.2 %
PATH INTERP BLD-IMP: NO
PLATELET # BLD AUTO: 126 K/UL (ref 135–450)
PMV BLD AUTO: 9.4 FL (ref 5–10.5)
POTASSIUM SERPL-SCNC: 3.3 MMOL/L (ref 3.5–5.1)
PROT SERPL-MCNC: 5.7 G/DL (ref 6.4–8.2)
RBC # BLD AUTO: 2.25 M/UL (ref 4.2–5.9)
SODIUM SERPL-SCNC: 136 MMOL/L (ref 136–145)
WBC # BLD AUTO: 2.5 K/UL (ref 4–11)

## 2024-07-23 PROCEDURE — 36415 COLL VENOUS BLD VENIPUNCTURE: CPT

## 2024-07-23 PROCEDURE — 81256 HFE GENE: CPT

## 2024-07-23 PROCEDURE — 6370000000 HC RX 637 (ALT 250 FOR IP): Performed by: INTERNAL MEDICINE

## 2024-07-23 PROCEDURE — 93306 TTE W/DOPPLER COMPLETE: CPT | Performed by: INTERNAL MEDICINE

## 2024-07-23 PROCEDURE — 73630 X-RAY EXAM OF FOOT: CPT

## 2024-07-23 PROCEDURE — 6360000002 HC RX W HCPCS: Performed by: INTERNAL MEDICINE

## 2024-07-23 PROCEDURE — 83735 ASSAY OF MAGNESIUM: CPT

## 2024-07-23 PROCEDURE — 6360000004 HC RX CONTRAST MEDICATION: Performed by: INTERNAL MEDICINE

## 2024-07-23 PROCEDURE — 80048 BASIC METABOLIC PNL TOTAL CA: CPT

## 2024-07-23 PROCEDURE — 82550 ASSAY OF CK (CPK): CPT

## 2024-07-23 PROCEDURE — 1200000000 HC SEMI PRIVATE

## 2024-07-23 PROCEDURE — 80076 HEPATIC FUNCTION PANEL: CPT

## 2024-07-23 PROCEDURE — 85025 COMPLETE CBC W/AUTO DIFF WBC: CPT

## 2024-07-23 PROCEDURE — 2580000003 HC RX 258: Performed by: INTERNAL MEDICINE

## 2024-07-23 PROCEDURE — 6370000000 HC RX 637 (ALT 250 FOR IP): Performed by: NURSE PRACTITIONER

## 2024-07-23 PROCEDURE — C8929 TTE W OR WO FOL WCON,DOPPLER: HCPCS

## 2024-07-23 PROCEDURE — 6370000000 HC RX 637 (ALT 250 FOR IP)

## 2024-07-23 RX ORDER — POTASSIUM CHLORIDE 20 MEQ/1
40 TABLET, EXTENDED RELEASE ORAL ONCE
Status: COMPLETED | OUTPATIENT
Start: 2024-07-23 | End: 2024-07-23

## 2024-07-23 RX ORDER — LANOLIN ALCOHOL/MO/W.PET/CERES
9 CREAM (GRAM) TOPICAL
Status: COMPLETED | OUTPATIENT
Start: 2024-07-23 | End: 2024-07-23

## 2024-07-23 RX ORDER — POTASSIUM CHLORIDE 7.45 MG/ML
10 INJECTION INTRAVENOUS
Status: DISCONTINUED | OUTPATIENT
Start: 2024-07-23 | End: 2024-07-23

## 2024-07-23 RX ADMIN — ASPIRIN 81 MG: 81 TABLET, COATED ORAL at 08:47

## 2024-07-23 RX ADMIN — SODIUM CHLORIDE, POTASSIUM CHLORIDE, SODIUM LACTATE AND CALCIUM CHLORIDE: 600; 310; 30; 20 INJECTION, SOLUTION INTRAVENOUS at 21:41

## 2024-07-23 RX ADMIN — POTASSIUM CHLORIDE 40 MEQ: 1500 TABLET, EXTENDED RELEASE ORAL at 08:46

## 2024-07-23 RX ADMIN — ACETAMINOPHEN 650 MG: 325 TABLET ORAL at 02:11

## 2024-07-23 RX ADMIN — METOPROLOL TARTRATE 50 MG: 50 TABLET, FILM COATED ORAL at 08:46

## 2024-07-23 RX ADMIN — PERFLUTREN 1.5 ML: 6.52 INJECTION, SUSPENSION INTRAVENOUS at 14:49

## 2024-07-23 RX ADMIN — METOPROLOL TARTRATE 50 MG: 50 TABLET, FILM COATED ORAL at 21:21

## 2024-07-23 RX ADMIN — SODIUM CHLORIDE, PRESERVATIVE FREE 10 ML: 5 INJECTION INTRAVENOUS at 21:23

## 2024-07-23 RX ADMIN — Medication 10 ML: at 21:22

## 2024-07-23 RX ADMIN — AMLODIPINE BESYLATE 5 MG: 5 TABLET ORAL at 08:46

## 2024-07-23 RX ADMIN — FENOFIBRATE 160 MG: 160 TABLET ORAL at 08:47

## 2024-07-23 RX ADMIN — Medication 100 MG: at 08:47

## 2024-07-23 RX ADMIN — LISINOPRIL 2.5 MG: 5 TABLET ORAL at 08:47

## 2024-07-23 RX ADMIN — MELATONIN TAB 3 MG 9 MG: 3 TAB at 21:39

## 2024-07-23 RX ADMIN — ENOXAPARIN SODIUM 40 MG: 100 INJECTION SUBCUTANEOUS at 08:46

## 2024-07-23 RX ADMIN — SODIUM CHLORIDE, PRESERVATIVE FREE 10 ML: 5 INJECTION INTRAVENOUS at 09:00

## 2024-07-23 RX ADMIN — WATER 1000 MG: 1 INJECTION INTRAMUSCULAR; INTRAVENOUS; SUBCUTANEOUS at 15:55

## 2024-07-23 RX ADMIN — MUPIROCIN: 20 OINTMENT TOPICAL at 09:00

## 2024-07-23 ASSESSMENT — PAIN SCALES - GENERAL
PAINLEVEL_OUTOF10: 0
PAINLEVEL_OUTOF10: 0
PAINLEVEL_OUTOF10: 4

## 2024-07-23 ASSESSMENT — PAIN DESCRIPTION - LOCATION: LOCATION: LEG

## 2024-07-23 ASSESSMENT — PAIN SCALES - WONG BAKER: WONGBAKER_NUMERICALRESPONSE: NO HURT

## 2024-07-23 ASSESSMENT — PAIN DESCRIPTION - DESCRIPTORS: DESCRIPTORS: DISCOMFORT

## 2024-07-23 ASSESSMENT — PAIN DESCRIPTION - ORIENTATION: ORIENTATION: RIGHT;LEFT

## 2024-07-23 NOTE — DISCHARGE INSTRUCTIONS
Podiatry Wound Care Discharge Instructions  Please perform every day dressing changes to left lower extremity as follows:    -Apply triple antibiotic ointment to the wound on the left lower extremity  -Next apply Band-aid over the great toe wound      Please follow-up with Dr. Regan San DPM for continued wound care

## 2024-07-23 NOTE — DISCHARGE INSTR - COC
Continuity of Care Form    Patient Name: Kevin Brown   :  1955  MRN:  [id number]    Admit date:  2024  Discharge date:  2024    Code Status Order: Full Code   Advance Directives:     Admitting Physician:  Aide Montoya MD  PCP: Derek Griffith MD    Discharging Nurse: Elinor SIMMONS RN  Discharging Hospital Unit/Room#: 5TN-5580/5580-01  Discharging Unit Phone Number: [phone]    Emergency Contact:   Extended Emergency Contact Information  Primary Emergency Contact: Claudia Brown  Address: [address] [place] [address]  Home Phone: [phone]  Relation: Spouse    Past Surgical History:  Past Surgical History:   Procedure Laterality Date    CARDIAC CATHETERIZATION      COLONOSCOPY N/A 2023    COLONOSCOPY DIAGNOSTIC performed by Fadi Freire MD at [facility] ENDOSCOPY    CORONARY ANGIOPLASTY WITH STENT PLACEMENT      UPPER GASTROINTESTINAL ENDOSCOPY N/A 2023    EGD BIOPSY performed by Fadi Freire MD at [facility] ENDOSCOPY       Immunization History:     There is no immunization history on file for this patient.    Active Problems:  Patient Active Problem List   Diagnosis Code    Dizziness and giddiness R42    Hyperlipidemia E78.5    Hypertension I10    CAD (coronary artery disease) I25.10    Equilibrium disorder R42    Old MI (myocardial infarction) I25.2    Acute blood loss anemia D62    Generalized weakness R53.1    Fall W19.XXXA       Isolation/Infection:   Isolation            No Isolation          Patient Infection Status       None to display            Nurse Assessment:  Last Vital Signs: /76   Pulse 100   Temp 98 °F (36.7 °C) (Oral)   Resp 18   Ht 1.803 m (5' 11\")   Wt 88.5 kg (195 lb)   SpO2 97%   BMI 27.20 kg/m²     Last documented pain score (0-10 scale): Pain Level: 0  Last Weight:   Wt Readings from Last 1 Encounters:   24 88.5 kg (195 lb)     Mental Status:  oriented and alert    IV Access:  - None    Nursing  Mobility/ADLs:  Walking   Dependent  Transfer  Assisted  Bathing  Assisted  Dressing  Assisted  Toileting  Assisted  Feeding  Independent  Med Admin  Assisted  Med Delivery   whole    Wound Care Documentation and Therapy:        Elimination:  Continence:   Bowel: Yes  Bladder: Yes  Urinary Catheter: None   Colostomy/Ileostomy/Ileal Conduit: No       Date of Last BM: 07/24/2024    Intake/Output Summary (Last 24 hours) at 7/23/2024 0835  Last data filed at 7/22/2024 1921  Gross per 24 hour   Intake --   Output 100 ml   Net -100 ml     I/O last 3 completed shifts:  In: -   Out: 100 [Urine:100]    Safety Concerns:     At Risk for Falls    Impairments/Disabilities:      Hearing    Nutrition Therapy:  Current Nutrition Therapy:   - Oral Diet:  General and Easy to Chew    Routes of Feeding: Oral  Liquids: Thin Liquids  Daily Fluid Restriction: no  Last Modified Barium Swallow with Video (Video Swallowing Test): not done    Treatments at the Time of Hospital Discharge:   Respiratory Treatments: None  Oxygen Therapy:  is not on home oxygen therapy.  Ventilator:    - No ventilator support    Rehab Therapies: Physical Therapy and Occupational Therapy  Weight Bearing Status/Restrictions: No weight bearing restrictions  Other Medical Equipment (for information only, NOT a DME order):  wheelchair and walker  Other Treatments: None    Patient's personal belongings (please select all that are sent with patient):  None    RN SIGNATURE:  Electronically signed by Elinor Corral RN on 7/26/24 at 4:09 PM EDT    CASE MANAGEMENT/SOCIAL WORK SECTION    Inpatient Status Date: 7/22/24    Readmission Risk Assessment Score:  Readmission Risk              Risk of Unplanned Readmission:  16         Discharging to Facility:  [address] [place] [place] [address]  Report: [phone]  Fax: [phone]     / signature: Electronically signed by Emilia Lucero RN on 7/26/24 at 2:52 PM EDT    PHYSICIAN

## 2024-07-23 NOTE — CONSULTS
Department of Podiatry Consult Note  Resident       Reason for Consult:  Great toe left foot. Toe nail is bleeding and lifting   Requesting Physician:  Dr. Aide Montoya MD    CHIEF COMPLAINT:  Left hallux nail pain    HISTORY OF PRESENT ILLNESS:                The patient is a 68 y.o. male with significant past medical history as seen below who is consulted for Great toe left foot. Toe nail is bleeding and lifting.  Patient states that while on vacation in [place] his wife accidentally stepped on his left foot more specifically on his big toe which is rather painful however did not notice any acute bleeding, bruising, or loosening of the nail however over the next week the toenail progressively became more discolored, painful, and began to lift.  When patient was subsequently admitted to the [facility] for progressive weakness and lower extremity weakness it was noted that his left hallux nail was extremely loose and bleeding leading to the consult. Patient denies any other pedal complaints at this time. Denies F/C/N/V.      Past Medical History:        Diagnosis Date    CAD (coronary artery disease)     Equilibrium disorder     Hyperlipidemia     Hypertension     Old MI (myocardial infarction)        Past Surgical History:        Procedure Laterality Date    CARDIAC CATHETERIZATION      COLONOSCOPY N/A 12/19/2023    COLONOSCOPY DIAGNOSTIC performed by Fadi Freire MD at [facility] ENDOSCOPY    CORONARY ANGIOPLASTY WITH STENT PLACEMENT      UPPER GASTROINTESTINAL ENDOSCOPY N/A 12/18/2023    EGD BIOPSY performed by Fadi Freire MD at [facility] ENDOSCOPY       Allergies:   Patient has no known allergies.    Medications:   Home Meds  No current facility-administered medications on file prior to encounter.     Current Outpatient Medications on File Prior to Encounter   Medication Sig Dispense Refill    pravastatin (PRAVACHOL) 40 MG tablet Take 1 tablet by mouth nightly      omeprazole (PRILOSEC) 20 MG  needed.    Patient staffed with Dr. Regan San, MAYA Springer DPM   Podiatric Resident PGY-1  Pager [phone] or Everve  7/23/2024, 6:53 AM

## 2024-07-23 NOTE — PROGRESS NOTES
[facility]    7/23/2024 11:01 AM        Name: Kevin Brown .              Admitted: 7/22/2024  Primary Care Provider: Derek Griffith MD (Tel: [phone])      Subjective:    Lying in bed no chest pain fever or chills    Reviewed interval ancillary notes    Current Medications  mupirocin (BACTROBAN) 2 % ointment, BID  cefTRIAXone (ROCEPHIN) 1,000 mg in sterile water 10 mL IV syringe, Q24H  iohexol (OMNIPAQUE 240) Oral 50 mL, ONCE PRN  amLODIPine (NORVASC) tablet 5 mg, Daily  aspirin EC tablet 81 mg, Daily  fenofibrate (TRIGLIDE) tablet 160 mg, Daily  lisinopril (PRINIVIL;ZESTRIL) tablet 2.5 mg, Daily  metoprolol tartrate (LOPRESSOR) tablet 50 mg, BID  [Held by provider] pravastatin (PRAVACHOL) tablet 40 mg, Nightly  sodium chloride flush 0.9 % injection 5-40 mL, 2 times per day  sodium chloride flush 0.9 % injection 5-40 mL, PRN  0.9 % sodium chloride infusion, PRN  enoxaparin (LOVENOX) injection 40 mg, Daily  polyethylene glycol (GLYCOLAX) packet 17 g, Daily PRN  sodium chloride flush 0.9 % injection 5-40 mL, 2 times per day  sodium chloride flush 0.9 % injection 5-40 mL, PRN  0.9 % sodium chloride infusion, PRN  thiamine mononitrate tablet 100 mg, Daily  lactated ringers IV soln infusion, Continuous  LORazepam (ATIVAN) tablet 1 mg, Q1H PRN   Or  LORazepam (ATIVAN) 1 mg in sodium chloride (PF) 0.9 % 10 mL injection, Q1H PRN   Or  LORazepam (ATIVAN) tablet 2 mg, Q1H PRN   Or  LORazepam (ATIVAN) 2 mg in sodium chloride (PF) 0.9 % 10 mL injection, Q1H PRN   Or  LORazepam (ATIVAN) tablet 3 mg, Q1H PRN   Or  LORazepam (ATIVAN) 3 mg in sodium chloride (PF) 0.9 % 10 mL injection, Q1H PRN   Or  LORazepam (ATIVAN) tablet 4 mg, Q1H PRN   Or  LORazepam (ATIVAN) 4 mg in sodium chloride (PF) 0.9 % 10 mL injection, Q1H PRN        Objective:  /80   Pulse 84   Temp 98.1 °F (36.7 °C) (Oral)   Resp 18   Ht 1.803 m (5' 11\")

## 2024-07-24 LAB
ANION GAP SERPL CALCULATED.3IONS-SCNC: 12 MMOL/L (ref 3–16)
BASOPHILS # BLD: 0 K/UL (ref 0–0.2)
BASOPHILS NFR BLD: 0.2 %
BUN SERPL-MCNC: 7 MG/DL (ref 7–20)
CALCIUM SERPL-MCNC: 7.3 MG/DL (ref 8.3–10.6)
CERULOPLASMIN SERPL-MCNC: 16 MG/DL (ref 15–30)
CHLORIDE SERPL-SCNC: 101 MMOL/L (ref 99–110)
CK SERPL-CCNC: 1118 U/L (ref 39–308)
CO2 SERPL-SCNC: 27 MMOL/L (ref 21–32)
CREAT SERPL-MCNC: <0.5 MG/DL (ref 0.8–1.3)
DEPRECATED RDW RBC AUTO: 17.6 % (ref 12.4–15.4)
EOSINOPHIL # BLD: 0 K/UL (ref 0–0.6)
EOSINOPHIL NFR BLD: 0.5 %
GFR SERPLBLD CREATININE-BSD FMLA CKD-EPI: >90 ML/MIN/{1.73_M2}
GLUCOSE SERPL-MCNC: 99 MG/DL (ref 70–99)
HCT VFR BLD AUTO: 28.2 % (ref 40.5–52.5)
HGB BLD-MCNC: 9.6 G/DL (ref 13.5–17.5)
LYMPHOCYTES # BLD: 0.7 K/UL (ref 1–5.1)
LYMPHOCYTES NFR BLD: 21.4 %
MCH RBC QN AUTO: 46.7 PG (ref 26–34)
MCHC RBC AUTO-ENTMCNC: 33.9 G/DL (ref 31–36)
MCV RBC AUTO: 138 FL (ref 80–100)
MONOCYTES # BLD: 0.3 K/UL (ref 0–1.3)
MONOCYTES NFR BLD: 7.8 %
NEUTROPHILS # BLD: 2.3 K/UL (ref 1.7–7.7)
NEUTROPHILS NFR BLD: 70.1 %
PATH INTERP BLD-IMP: NO
PLATELET # BLD AUTO: 136 K/UL (ref 135–450)
PMV BLD AUTO: 9 FL (ref 5–10.5)
POTASSIUM SERPL-SCNC: 4.2 MMOL/L (ref 3.5–5.1)
PROCALCITONIN SERPL IA-MCNC: 7.24 NG/ML (ref 0–0.15)
RBC # BLD AUTO: 2.05 M/UL (ref 4.2–5.9)
SMA IGG SER-ACNC: 4 UNITS (ref 0–19)
SODIUM SERPL-SCNC: 140 MMOL/L (ref 136–145)
WBC # BLD AUTO: 3.3 K/UL (ref 4–11)

## 2024-07-24 PROCEDURE — 36415 COLL VENOUS BLD VENIPUNCTURE: CPT

## 2024-07-24 PROCEDURE — 80048 BASIC METABOLIC PNL TOTAL CA: CPT

## 2024-07-24 PROCEDURE — 97161 PT EVAL LOW COMPLEX 20 MIN: CPT

## 2024-07-24 PROCEDURE — 82550 ASSAY OF CK (CPK): CPT

## 2024-07-24 PROCEDURE — 85025 COMPLETE CBC W/AUTO DIFF WBC: CPT

## 2024-07-24 PROCEDURE — 97530 THERAPEUTIC ACTIVITIES: CPT

## 2024-07-24 PROCEDURE — 1200000000 HC SEMI PRIVATE

## 2024-07-24 PROCEDURE — 92610 EVALUATE SWALLOWING FUNCTION: CPT

## 2024-07-24 PROCEDURE — 6370000000 HC RX 637 (ALT 250 FOR IP): Performed by: INTERNAL MEDICINE

## 2024-07-24 PROCEDURE — 84145 PROCALCITONIN (PCT): CPT

## 2024-07-24 PROCEDURE — 92526 ORAL FUNCTION THERAPY: CPT

## 2024-07-24 PROCEDURE — 2580000003 HC RX 258: Performed by: INTERNAL MEDICINE

## 2024-07-24 PROCEDURE — 6360000002 HC RX W HCPCS: Performed by: INTERNAL MEDICINE

## 2024-07-24 PROCEDURE — 97165 OT EVAL LOW COMPLEX 30 MIN: CPT

## 2024-07-24 RX ORDER — TRAMADOL HYDROCHLORIDE 50 MG/1
50 TABLET ORAL EVERY 6 HOURS PRN
Status: DISCONTINUED | OUTPATIENT
Start: 2024-07-24 | End: 2024-07-26 | Stop reason: HOSPADM

## 2024-07-24 RX ADMIN — WATER 1000 MG: 1 INJECTION INTRAMUSCULAR; INTRAVENOUS; SUBCUTANEOUS at 16:36

## 2024-07-24 RX ADMIN — ENOXAPARIN SODIUM 40 MG: 100 INJECTION SUBCUTANEOUS at 09:39

## 2024-07-24 RX ADMIN — POLYETHYLENE GLYCOL 3350 17 G: 17 POWDER, FOR SOLUTION ORAL at 16:36

## 2024-07-24 RX ADMIN — METOPROLOL TARTRATE 50 MG: 50 TABLET, FILM COATED ORAL at 09:39

## 2024-07-24 RX ADMIN — TRAMADOL HYDROCHLORIDE 50 MG: 50 TABLET ORAL at 23:21

## 2024-07-24 RX ADMIN — SODIUM CHLORIDE, PRESERVATIVE FREE 10 ML: 5 INJECTION INTRAVENOUS at 21:28

## 2024-07-24 RX ADMIN — FENOFIBRATE 160 MG: 160 TABLET ORAL at 09:39

## 2024-07-24 RX ADMIN — AMLODIPINE BESYLATE 5 MG: 5 TABLET ORAL at 09:38

## 2024-07-24 RX ADMIN — Medication 100 MG: at 09:38

## 2024-07-24 RX ADMIN — METOPROLOL TARTRATE 50 MG: 50 TABLET, FILM COATED ORAL at 21:28

## 2024-07-24 RX ADMIN — ASPIRIN 81 MG: 81 TABLET, COATED ORAL at 09:38

## 2024-07-24 RX ADMIN — Medication 10 ML: at 09:39

## 2024-07-24 RX ADMIN — SODIUM CHLORIDE, POTASSIUM CHLORIDE, SODIUM LACTATE AND CALCIUM CHLORIDE: 600; 310; 30; 20 INJECTION, SOLUTION INTRAVENOUS at 05:30

## 2024-07-24 RX ADMIN — TRAMADOL HYDROCHLORIDE 50 MG: 50 TABLET ORAL at 15:42

## 2024-07-24 RX ADMIN — LISINOPRIL 2.5 MG: 5 TABLET ORAL at 09:39

## 2024-07-24 RX ADMIN — SODIUM CHLORIDE, POTASSIUM CHLORIDE, SODIUM LACTATE AND CALCIUM CHLORIDE: 600; 310; 30; 20 INJECTION, SOLUTION INTRAVENOUS at 22:21

## 2024-07-24 ASSESSMENT — PAIN SCALES - GENERAL: PAINLEVEL_OUTOF10: 7

## 2024-07-24 ASSESSMENT — PAIN DESCRIPTION - LOCATION
LOCATION: LEG
LOCATION: LEG

## 2024-07-24 ASSESSMENT — PAIN DESCRIPTION - ORIENTATION
ORIENTATION: LEFT;RIGHT
ORIENTATION: LEFT;RIGHT

## 2024-07-24 NOTE — PROGRESS NOTES
Facility/Department: [address] ONCOLOGY  Speech Language Pathology  DYSPHAGIA BEDSIDE SWALLOW EVALUATION     Patient: Kevin Brown   : 1955   MRN: [id number]      Evaluation Date: 2024   Admitting Diagnosis: Generalized weakness [R53.1]  Leg weakness, bilateral [R29.898]  Fall, initial encounter [W19.XXXA]  Cardiomyopathy, unspecified type (HCC) [I42.9]  Pain: Did not state                                                       H&P: This is a 68 y.o. male who was brought in by EMS transportation for generalized weakness and a fall at home.  Patient has had progressively worsening generalized weakness, particularly weakness in his bilateral lower extremities, developing over the last several weeks.  Per his wife, he has had multiple falls where his legs simply become too weak to support him and he fell to the ground.  This morning, this occurred while using his cane, resulting in a slow fall to the car in his house.  He did not hit his head and did not lose consciousness.  He is complaining of pain in his neck and back which he believes is as a result of the fall.  He denies any pain or injury to his arms or legs.  He denies having had associated chest pain, shortness of breath, palpitations, lightheadedness, or dizziness prior to the fall.  He has no focal weakness.  His wife states in the past when he felt this way he was found to be anemic and required blood.  She also reports cardiac rehabilitation and PT/OT was recommended on the previous hospital stay, but patient refused it.  He is not currently undergoing any PT OT.    Imaging:  Chest X-ray:   IMPRESSION:  No acute process.    Head CT:   IMPRESSION:  No acute intracranial abnormality.    History/Prior Level of Function:   Living Status: lives with their family (wife and son)  Prior Dysphagia History: No prior history of dysphagia, regular texture with thin liquids at baseline. Pt with some difficulty chewing due to poor fitted dentures  Reason  Diet and Intervention 7/24/2024:  Diet Solids Recommendation:  Easy to chew  Liquid Consistency Recommendation:  Thin liquids, No straws  Recommended form of Meds: Meds whole with water       Compensatory Swallowing Strategies:  Upright as possible with all PO intake , No straws , Small bites/sips , Eat/feed slowly, Remain upright 30-45 min     SHORT TERM DYSPHAGIA GOALS/PLAN OF CARE: Speech therapy for dysphagia tx 1-2 times to ensure diet tolerance.  Pt will functionally tolerate recommended diet with no overt clinical s/s of aspiration   Pt will demonstrate understanding of aspiration risk and precautions via education/demonstration with occasional prompting  If clinical s/s of aspiration/penetration continue to be noted, Pt will participate in Modified Barium Swallow Study (MBS)     Dysphagia Therapeutic Intervention:  Diet Tolerance Monitoring , Patient/Family Education , Therapeutic Trials with SLP     Discharge Recommendations: Discharge recommendations to be determined pending ongoing follow-up during acute care stay    Patient Positioning: Upright in bed     Current Diet Level (prior to evaluation): Regular texture diet  Thin liquids      Respiratory Status:   [x]Room Air   []O2 via nasal cannula  []Previously intubated:   Total days intubated:  Date extubated:    []Other:    Dentition:  []Adequate  [x]Dentures: lower dentures are loose fitting  []Missing Many Teeth  []Edentulous  []Other:    Baseline Vocal Quality:  [x]Normal  []Dysphonic   []Aphonic   []Hoarse  []Wet  []Weak  []Other:    Volitional Cough:  Elicited: Weak     Volitional Swallow:   []Absent   []Delayed     [x]Adequate     []Required use of drink     Oral Mechanism Exam:  []WFL [x]Mild   [] Moderate  []Severe  []To be assessed  Impaired:   []Left side      []Right side    []Labial ROM/Coordination    []Labial Symmetry   [x]Lingual ROM/Coordination   []Lingual Symmetry  []Gag  []Other:     Oral Phase: []WFL [x]Mild   [] Moderate  []Severe

## 2024-07-24 NOTE — PROGRESS NOTES
date.  Comments:  Balance:  Static Sitting Balance: poor (+): requires min (A) to maintain balance  Dynamic Sitting Balance: poor (+): requires min (A) to maintain balance  Static Standing Balance: poor (-): requires max (A) to maintain balance  Comments: Support provided in sitting due to anterior trunk lean;  pt appears to have decreased awareness of his body position    Other Therapeutic Interventions    Functional Outcomes  AM-PAC Inpatient Mobility Raw Score : 9              Cognition  WFL  Orientation:    alert and oriented x 4  Command Following:   WFL    Education  Barriers To Learning: hearing  Patient Education: patient educated on goals, PT role and benefits, plan of care, discharge recommendations  Learning Assessment:  patient verbalizes understanding, would benefit from continued reinforcement    Assessment  Activity Tolerance: limited by LE pain and weakness  Impairments Requiring Therapeutic Intervention: decreased functional mobility, decreased strength, decreased balance  Prognosis: good  Clinical Assessment: Pt presenting below his functional mobility baseline due to BLE weakness and pain.  Pt currently requiring 2 person assist to stand up at a RW requiring heavy support through his UEs as well as physical assist.  Unable to ambulate.  Continued skilled PT to promote functional progress towards pt's baseline.   Safety Interventions: patient left in bed, bed alarm in place, call light within reach, and nurse notified    Plan  Frequency: 3-5 x/per week  Current Treatment Recommendations: strengthening, balance training, functional mobility training, transfer training, gait training, stair training, and endurance training    Goals  Patient Goals: to regain strength    Short Term Goals:  Time Frame: by dc  Patient will complete bed mobility at contact guard assistance   Patient will complete transfers at minimal assistance   Patient will ambulate 25 ft with use of rolling walker at moderate

## 2024-07-24 NOTE — PROGRESS NOTES
Shift assessment. VSS. Patient alert and oriented lying in bed in high-fowlers position. No symptoms of alcohol withdrawal. Respiration even and unlabored on room air. Patient is asking for sleeping pills to help him sleep tonight. Oral Melatonin given. No further complaints at this  moment. Plan of care ongoing.

## 2024-07-24 NOTE — PROGRESS NOTES
[facility]    7/24/2024 12:48 PM        Name: Kevin Brown .              Admitted: 7/22/2024  Primary Care Provider: Derek Griffith MD (Tel: [phone])      Subjective:      Bed no nausea vomiting chest pain    Reviewed interval ancillary notes    Current Medications  mupirocin (BACTROBAN) 2 % ointment, BID  cefTRIAXone (ROCEPHIN) 1,000 mg in sterile water 10 mL IV syringe, Q24H  iohexol (OMNIPAQUE 240) Oral 50 mL, ONCE PRN  amLODIPine (NORVASC) tablet 5 mg, Daily  aspirin EC tablet 81 mg, Daily  fenofibrate (TRIGLIDE) tablet 160 mg, Daily  lisinopril (PRINIVIL;ZESTRIL) tablet 2.5 mg, Daily  metoprolol tartrate (LOPRESSOR) tablet 50 mg, BID  [Held by provider] pravastatin (PRAVACHOL) tablet 40 mg, Nightly  sodium chloride flush 0.9 % injection 5-40 mL, 2 times per day  sodium chloride flush 0.9 % injection 5-40 mL, PRN  0.9 % sodium chloride infusion, PRN  enoxaparin (LOVENOX) injection 40 mg, Daily  polyethylene glycol (GLYCOLAX) packet 17 g, Daily PRN  sodium chloride flush 0.9 % injection 5-40 mL, 2 times per day  sodium chloride flush 0.9 % injection 5-40 mL, PRN  0.9 % sodium chloride infusion, PRN  thiamine mononitrate tablet 100 mg, Daily  lactated ringers IV soln infusion, Continuous  LORazepam (ATIVAN) tablet 1 mg, Q1H PRN   Or  LORazepam (ATIVAN) 1 mg in sodium chloride (PF) 0.9 % 10 mL injection, Q1H PRN   Or  LORazepam (ATIVAN) tablet 2 mg, Q1H PRN   Or  LORazepam (ATIVAN) 2 mg in sodium chloride (PF) 0.9 % 10 mL injection, Q1H PRN   Or  LORazepam (ATIVAN) tablet 3 mg, Q1H PRN   Or  LORazepam (ATIVAN) 3 mg in sodium chloride (PF) 0.9 % 10 mL injection, Q1H PRN   Or  LORazepam (ATIVAN) tablet 4 mg, Q1H PRN   Or  LORazepam (ATIVAN) 4 mg in sodium chloride (PF) 0.9 % 10 mL injection, Q1H PRN        Objective:  /66   Pulse (!) 102   Temp 98.8 °F (37.1 °C) (Oral)   Resp 16   Ht 1.803 m (5' 11\")   Wt

## 2024-07-24 NOTE — PROGRESS NOTES
Occupational Therapy    [facility] - Inpatient Rehabilitation Department   Phone: [phone]    Occupational Therapy    [x] Initial Evaluation            [] Daily Treatment Note         [] Discharge Summary      Patient: Kevin Brown   : 1955   MRN: [id number]   Date of Service:  2024    Admitting Diagnosis:  Generalized weakness  Current Admission Summary: This is a 68 y.o. male who was brought in by EMS transportation for generalized weakness and a fall at home.  Patient has had progressively worsening generalized weakness, particularly weakness in his bilateral lower extremities, developing over the last several weeks.  Per his wife, he has had multiple falls where his legs simply become too weak to support him and he fell to the ground.  This morning, this occurred while using his cane, resulting in a slow fall to the car in his house.  He did not hit his head and did not lose consciousness.  He is complaining of pain in his neck and back which he believes is as a result of the fall.  He denies any pain or injury to his arms or legs.  He denies having had associated chest pain, shortness of breath, palpitations, lightheadedness, or dizziness prior to the fall.  He has no focal weakness.  His wife states in the past when he felt this way he was found to be anemic and required blood.  She also reports cardiac rehabilitation and PT/OT was recommended on the previous hospital stay, but patient refused it.  He is not currently undergoing any PT OT.   Past Medical History:  has a past medical history of CAD (coronary artery disease), Equilibrium disorder, Hyperlipidemia, Hypertension, and Old MI (myocardial infarction).  Past Surgical History:  has a past surgical history that includes Coronary angioplasty with stent; Cardiac catheterization; Upper gastrointestinal endoscopy (N/A, 2023); and Colonoscopy (N/A, 2023).    Discharge Recommendations: Kevin Brown scored a  on the

## 2024-07-25 PROBLEM — E44.0 MODERATE MALNUTRITION (HCC): Chronic | Status: ACTIVE | Noted: 2024-07-25

## 2024-07-25 LAB
A1AT PHENOTYP SERPL-IMP: NORMAL
A1AT SERPL-MCNC: 191 MG/DL (ref 90–200)
ANION GAP SERPL CALCULATED.3IONS-SCNC: 11 MMOL/L (ref 3–16)
ANISOCYTOSIS BLD QL SMEAR: ABNORMAL
BASOPHILS # BLD: 0 K/UL (ref 0–0.2)
BASOPHILS NFR BLD: 1 %
BUN SERPL-MCNC: 7 MG/DL (ref 7–20)
CALCIUM SERPL-MCNC: 7.9 MG/DL (ref 8.3–10.6)
CHLORIDE SERPL-SCNC: 100 MMOL/L (ref 99–110)
CK SERPL-CCNC: 571 U/L (ref 39–308)
CO2 SERPL-SCNC: 26 MMOL/L (ref 21–32)
CREAT SERPL-MCNC: <0.5 MG/DL (ref 0.8–1.3)
DEPRECATED RDW RBC AUTO: 17 % (ref 12.4–15.4)
EOSINOPHIL # BLD: 0 K/UL (ref 0–0.6)
EOSINOPHIL NFR BLD: 1 %
GFR SERPLBLD CREATININE-BSD FMLA CKD-EPI: >90 ML/MIN/{1.73_M2}
GLUCOSE SERPL-MCNC: 92 MG/DL (ref 70–99)
HCT VFR BLD AUTO: 28 % (ref 40.5–52.5)
HGB BLD-MCNC: 9.7 G/DL (ref 13.5–17.5)
LYMPHOCYTES # BLD: 0.8 K/UL (ref 1–5.1)
LYMPHOCYTES NFR BLD: 20 %
MACROCYTES BLD QL SMEAR: ABNORMAL
MCH RBC QN AUTO: 47.9 PG (ref 26–34)
MCHC RBC AUTO-ENTMCNC: 34.7 G/DL (ref 31–36)
MCV RBC AUTO: 138 FL (ref 80–100)
MICROCYTES BLD QL SMEAR: ABNORMAL
MONOCYTES # BLD: 0.3 K/UL (ref 0–1.3)
MONOCYTES NFR BLD: 9 %
NEUTROPHILS # BLD: 2.6 K/UL (ref 1.7–7.7)
NEUTROPHILS NFR BLD: 69 %
NRBC BLD-RTO: 5 /100 WBC
PATH INTERP BLD-IMP: NO
PLATELET # BLD AUTO: 145 K/UL (ref 135–450)
PLATELET BLD QL SMEAR: ADEQUATE
PMV BLD AUTO: 8.4 FL (ref 5–10.5)
POLYCHROMASIA BLD QL SMEAR: ABNORMAL
POTASSIUM SERPL-SCNC: 4.6 MMOL/L (ref 3.5–5.1)
RBC # BLD AUTO: 2.03 M/UL (ref 4.2–5.9)
SLIDE REVIEW: ABNORMAL
SODIUM SERPL-SCNC: 137 MMOL/L (ref 136–145)
WBC # BLD AUTO: 3.8 K/UL (ref 4–11)

## 2024-07-25 PROCEDURE — 97530 THERAPEUTIC ACTIVITIES: CPT

## 2024-07-25 PROCEDURE — 2580000003 HC RX 258: Performed by: INTERNAL MEDICINE

## 2024-07-25 PROCEDURE — 97535 SELF CARE MNGMENT TRAINING: CPT

## 2024-07-25 PROCEDURE — 36415 COLL VENOUS BLD VENIPUNCTURE: CPT

## 2024-07-25 PROCEDURE — 85025 COMPLETE CBC W/AUTO DIFF WBC: CPT

## 2024-07-25 PROCEDURE — 6360000002 HC RX W HCPCS: Performed by: INTERNAL MEDICINE

## 2024-07-25 PROCEDURE — 82550 ASSAY OF CK (CPK): CPT

## 2024-07-25 PROCEDURE — 80048 BASIC METABOLIC PNL TOTAL CA: CPT

## 2024-07-25 PROCEDURE — 1200000000 HC SEMI PRIVATE

## 2024-07-25 PROCEDURE — 6370000000 HC RX 637 (ALT 250 FOR IP): Performed by: INTERNAL MEDICINE

## 2024-07-25 RX ORDER — THIAMINE MONONITRATE (VIT B1) 100 MG
100 TABLET ORAL DAILY
Qty: 30 TABLET | Refills: 1 | Status: SHIPPED | OUTPATIENT
Start: 2024-07-26

## 2024-07-25 RX ADMIN — LISINOPRIL 2.5 MG: 5 TABLET ORAL at 10:34

## 2024-07-25 RX ADMIN — Medication 10 ML: at 04:17

## 2024-07-25 RX ADMIN — TRAMADOL HYDROCHLORIDE 50 MG: 50 TABLET ORAL at 21:04

## 2024-07-25 RX ADMIN — ASPIRIN 81 MG: 81 TABLET, COATED ORAL at 10:34

## 2024-07-25 RX ADMIN — SODIUM CHLORIDE, POTASSIUM CHLORIDE, SODIUM LACTATE AND CALCIUM CHLORIDE: 600; 310; 30; 20 INJECTION, SOLUTION INTRAVENOUS at 10:40

## 2024-07-25 RX ADMIN — Medication 100 MG: at 10:36

## 2024-07-25 RX ADMIN — AMLODIPINE BESYLATE 5 MG: 5 TABLET ORAL at 10:34

## 2024-07-25 RX ADMIN — ENOXAPARIN SODIUM 40 MG: 100 INJECTION SUBCUTANEOUS at 10:33

## 2024-07-25 RX ADMIN — METOPROLOL TARTRATE 50 MG: 50 TABLET, FILM COATED ORAL at 21:04

## 2024-07-25 RX ADMIN — SODIUM CHLORIDE, POTASSIUM CHLORIDE, SODIUM LACTATE AND CALCIUM CHLORIDE: 600; 310; 30; 20 INJECTION, SOLUTION INTRAVENOUS at 21:04

## 2024-07-25 RX ADMIN — TRAMADOL HYDROCHLORIDE 50 MG: 50 TABLET ORAL at 14:55

## 2024-07-25 RX ADMIN — MUPIROCIN: 20 OINTMENT TOPICAL at 21:05

## 2024-07-25 RX ADMIN — SODIUM CHLORIDE, PRESERVATIVE FREE 10 ML: 5 INJECTION INTRAVENOUS at 10:38

## 2024-07-25 RX ADMIN — FENOFIBRATE 160 MG: 160 TABLET ORAL at 10:35

## 2024-07-25 RX ADMIN — METOPROLOL TARTRATE 50 MG: 50 TABLET, FILM COATED ORAL at 10:34

## 2024-07-25 RX ADMIN — SODIUM CHLORIDE, PRESERVATIVE FREE 10 ML: 5 INJECTION INTRAVENOUS at 21:05

## 2024-07-25 RX ADMIN — WATER 1000 MG: 1 INJECTION INTRAMUSCULAR; INTRAVENOUS; SUBCUTANEOUS at 16:22

## 2024-07-25 RX ADMIN — MUPIROCIN: 20 OINTMENT TOPICAL at 10:32

## 2024-07-25 ASSESSMENT — PAIN SCALES - GENERAL
PAINLEVEL_OUTOF10: 4
PAINLEVEL_OUTOF10: 7
PAINLEVEL_OUTOF10: 7

## 2024-07-25 ASSESSMENT — PAIN DESCRIPTION - LOCATION
LOCATION: HEAD;BACK;LEG
LOCATION: LEG

## 2024-07-25 ASSESSMENT — PAIN DESCRIPTION - ORIENTATION
ORIENTATION: RIGHT;LEFT;POSTERIOR
ORIENTATION: RIGHT;LEFT

## 2024-07-25 ASSESSMENT — PAIN DESCRIPTION - DESCRIPTORS: DESCRIPTORS: ACHING;THROBBING

## 2024-07-25 NOTE — PROGRESS NOTES
Speech Language Pathology  Attempt    Kevin Boldenab  1955    Attempted to see pt for dysphagia therapy this date. Pt is currently working with PT/OT.  Will attempt to follow-up as schedule allows.     Thank you,     Chung Vo M.A., CCC-SLP [id number]  Speech-Language Pathologist

## 2024-07-25 NOTE — PROGRESS NOTES
Occupational Therapy    [facility] - Inpatient Rehabilitation Department   Phone: [phone]    Occupational Therapy    [] Initial Evaluation            [x] Daily Treatment Note         [] Discharge Summary      Patient: Kevin Bronw   : 1955   MRN: [id number]   Date of Service:  2024    Admitting Diagnosis:  Generalized weakness  Current Admission Summary: This is a 68 y.o. male who was brought in by EMS transportation for generalized weakness and a fall at home.  Patient has had progressively worsening generalized weakness, particularly weakness in his bilateral lower extremities, developing over the last several weeks.  Per his wife, he has had multiple falls where his legs simply become too weak to support him and he fell to the ground.  This morning, this occurred while using his cane, resulting in a slow fall to the car in his house.  He did not hit his head and did not lose consciousness.  He is complaining of pain in his neck and back which he believes is as a result of the fall.  He denies any pain or injury to his arms or legs.  He denies having had associated chest pain, shortness of breath, palpitations, lightheadedness, or dizziness prior to the fall.  He has no focal weakness.  His wife states in the past when he felt this way he was found to be anemic and required blood.  She also reports cardiac rehabilitation and PT/OT was recommended on the previous hospital stay, but patient refused it.  He is not currently undergoing any PT OT.   Past Medical History:  has a past medical history of CAD (coronary artery disease), Equilibrium disorder, Hyperlipidemia, Hypertension, and Old MI (myocardial infarction).  Past Surgical History:  has a past surgical history that includes Coronary angioplasty with stent; Cardiac catheterization; Upper gastrointestinal endoscopy (N/A, 2023); and Colonoscopy (N/A, 2023).    Discharge Recommendations: Kvein Brown scored a 16/24 on the

## 2024-07-25 NOTE — PROGRESS NOTES
Nutrition Note    RECOMMENDATIONS  PO Diet: Diet per SLP  ONS: Decrease Ensure Plus HP to once daily per pt's request     NUTRITION ASSESSMENT   Pt triggered for positive nutrition screen. Upon visiting, pt reported unintentional wt loss but happy he has lost wt and inadequate po intake r/t ETOH abuse. Note of pt drinking 8-10 shots of liquor daily. Could not specify amount of wt loss or timeframe of decreased po intake. Per chart review, appears pt has reported 30 to 50 lb wt loss over the past 7 months to 1 year. Wt hx in EMR indicates 25 lb (11.3%) wt loss in 7 months. Currently receiving Ensure Plus HP 6 times daily, wants decreased to once daily. RD will monitor for adequate po intake.     Nutrition Related Findings: LR at 125 mL/hr. Labs reviewed. LBM pta. No edema noted.  Wounds: None  Nutrition Education:  Education not indicated   Nutrition Goals: PO intake 50% or greater, by next RD assessment     MALNUTRITION ASSESSMENT   Social/Environmental Circumstances  Malnutrition Status: Moderate malnutrition  Findings of the 6 clinical characteristics of malnutrition:  Energy Intake:  Less than 75% estimated energy requirements for 3 months or longer  Weight Loss:  10% over 6 months (25 lb (11.3%) wt loss in 7 months)     Body Fat Loss:  No significant body fat loss     Muscle Mass Loss:  Mild muscle mass loss Clavicles (pectoralis & deltoids)  Fluid Accumulation:  No significant fluid accumulation     Strength:  Not Performed    NUTRITION DIAGNOSIS   Moderate malnutrition, In context of social or environmental circumstances related to inadequate protein-energy intake as evidenced by Criteria as identified in malnutrition assessment    CURRENT NUTRITION THERAPIES  ADULT ORAL NUTRITION SUPPLEMENT; Breakfast, Lunch, Dinner; Standard High Calorie/High Protein Oral Supplement  ADULT ORAL NUTRITION SUPPLEMENT; Breakfast, Dinner, Lunch; Standard High Calorie/High Protein Oral Supplement  ADULT DIET; Easy to Chew; No

## 2024-07-25 NOTE — PROGRESS NOTES
[facility] - Inpatient Rehabilitation Department   Phone: [phone]    Physical Therapy    [] Initial Evaluation            [x] Daily Treatment Note         [] Discharge Summary      Patient: Kevin Brown   : 1955   MRN: [id number]   Date of Service:  2024  Admitting Diagnosis: Generalized weakness  Current Admission Summary: This is a 68 y.o. male who was brought in by EMS transportation for generalized weakness and a fall at home.  Patient has had progressively worsening generalized weakness, particularly weakness in his bilateral lower extremities, developing over the last several weeks.  Per his wife, he has had multiple falls where his legs simply become too weak to support him and he fell to the ground.  This morning, this occurred while using his cane, resulting in a slow fall to the car in his house.  He did not hit his head and did not lose consciousness.  He is complaining of pain in his neck and back which he believes is as a result of the fall.  He denies any pain or injury to his arms or legs.  He denies having had associated chest pain, shortness of breath, palpitations, lightheadedness, or dizziness prior to the fall.  He has no focal weakness.  His wife states in the past when he felt this way he was found to be anemic and required blood.  She also reports cardiac rehabilitation and PT/OT was recommended on the previous hospital stay, but patient refused it.  He is not currently undergoing any PT OT.   Past Medical History:  has a past medical history of CAD (coronary artery disease), Equilibrium disorder, Hyperlipidemia, Hypertension, and Old MI (myocardial infarction).  Past Surgical History:  has a past surgical history that includes Coronary angioplasty with stent; Cardiac catheterization; Upper gastrointestinal endoscopy (N/A, 2023); and Colonoscopy (N/A, 2023).  Discharge Recommendations: Kevin Brown scored a 9/24 on the AM-PAC short mobility form.

## 2024-07-25 NOTE — PROGRESS NOTES
Occupational Therapy  Pt chart reviewed. Pt declined ADLs at bed level this date. Agreeable to OT/PT cotx for mobility later. Therapy will follow up as schedule allows.    Thank you,  Dayna Germain, FLORIDALMA OTR/L [id number]

## 2024-07-25 NOTE — PROGRESS NOTES
16   Ht 1.803 m (5' 11\")   Wt 89.6 kg (197 lb 8.5 oz)   SpO2 95%   BMI 27.55 kg/m²     Intake/Output Summary (Last 24 hours) at 7/25/2024 1258  Last data filed at 7/25/2024 1038  Gross per 24 hour   Intake 5133.74 ml   Output 2000 ml   Net 3133.74 ml        Wt Readings from Last 3 Encounters:   07/24/24 89.6 kg (197 lb 8.5 oz)   04/18/24 91.6 kg (201 lb 14.4 oz)   12/26/23 99.4 kg (219 lb 3.2 oz)       General appearance:  Appears comfortable. AAOx3  HEENT: atraumatic, Pupils equal, muscous membranes moist, no masses appreciated  Cardiovascular: Regular rate and rhythm no murmurs appreciated  Respiratory: CTAB no wheezing  Gastrointestinal: Abdomen soft, non-tender, BS+  EXT: no edema  Neurology: no gross focal deficts  Psychiatry: Appropriate affect. Not agitated  Skin: Warm, dry, no rashes appreciated    Labs and Tests:  CBC:   Recent Labs     07/23/24  0533 07/24/24  0603 07/25/24  0617   WBC 2.5* 3.3* 3.8*   HGB 10.5* 9.6* 9.7*   * 136 145       BMP:    Recent Labs     07/23/24  0533 07/24/24  0603 07/25/24  0617    140 137   K 3.3* 4.2 4.6   CL 94* 101 100   CO2 31 27 26   BUN 8 7 7   CREATININE <0.5* <0.5* <0.5*   GLUCOSE 91 99 92       Hepatic:   Recent Labs     07/23/24  0533   *   ALT 13   BILITOT 1.6*   ALKPHOS 85       XR FOOT LEFT (MIN 3 VIEWS)   Final Result   No acute finding of the left foot.         CT ABDOMEN PELVIS W IV CONTRAST Additional Contrast? Oral   Final Result   1.  No acute abdominal or pelvic findings.      2.  Bibasilar airspace opacities.  This could represent atelectasis versus   developing pneumonia.  Correlate clinically.      3.  Hepatic steatosis.      4.  No gallstones are visible.  No surrounding gallbladder inflammatory   change.         US ABDOMEN LIMITED Specify organ? LIVER, GALLBLADDER, PANCREAS   Final Result   1. Gallbladder sludge, without evidence of cholelithiasis or cholecystitis.   2. Diffuse hepatic steatosis.   3. Unremarkable sonographic  appearance of the common bile duct, right kidney,   and visualized pancreas.         XR CHEST PORTABLE   Final Result   No acute process.         CT THORACIC SPINE WO CONTRAST   Final Result   No acute thoracic or lumbar spine trauma.         CT LUMBAR SPINE WO CONTRAST   Final Result   No acute thoracic or lumbar spine trauma.         CT CERVICAL SPINE WO CONTRAST   Final Result   No acute abnormality of the cervical spine.         CT HEAD WO CONTRAST   Final Result   No acute intracranial abnormality.             Recent imaging reviewed    Problem List  Principal Problem:    Generalized weakness  Active Problems:    Fall    Moderate malnutrition (HCC)  Resolved Problems:    * No resolved hospital problems. *       Assessment/Plan:   Etoh abuse with weakness  Pt ot  Iv fluids  Ciwa protocol  Thimaine     Elevated procal , ct possible pna   Iv rocephin       Elevated ck down to 1500 repeat in am continue ivf       Elevated lfts trend, elevated ferritin and iron sat discusse dwith gi will start hemochromatosis work up pending    Pancytopneia secondary to above monitor cbc in am      DVT prophylaxis lovenox  Code status full code      Aide Montoya MD   7/25/2024 12:58 PM

## 2024-07-26 VITALS
RESPIRATION RATE: 16 BRPM | HEART RATE: 90 BPM | WEIGHT: 197.53 LBS | SYSTOLIC BLOOD PRESSURE: 154 MMHG | BODY MASS INDEX: 27.65 KG/M2 | TEMPERATURE: 98.7 F | DIASTOLIC BLOOD PRESSURE: 78 MMHG | HEIGHT: 71 IN | OXYGEN SATURATION: 93 %

## 2024-07-26 LAB
ANION GAP SERPL CALCULATED.3IONS-SCNC: 10 MMOL/L (ref 3–16)
BACTERIA BLD CULT ORG #2: NORMAL
BACTERIA BLD CULT: NORMAL
BASOPHILS # BLD: 0 K/UL (ref 0–0.2)
BASOPHILS NFR BLD: 0.4 %
BUN SERPL-MCNC: 7 MG/DL (ref 7–20)
CALCIUM SERPL-MCNC: 7.6 MG/DL (ref 8.3–10.6)
CHLORIDE SERPL-SCNC: 99 MMOL/L (ref 99–110)
CK SERPL-CCNC: 296 U/L (ref 39–308)
CO2 SERPL-SCNC: 27 MMOL/L (ref 21–32)
CREAT SERPL-MCNC: <0.5 MG/DL (ref 0.8–1.3)
DEPRECATED RDW RBC AUTO: 16.9 % (ref 12.4–15.4)
EOSINOPHIL # BLD: 0.1 K/UL (ref 0–0.6)
EOSINOPHIL NFR BLD: 1.6 %
GFR SERPLBLD CREATININE-BSD FMLA CKD-EPI: >90 ML/MIN/{1.73_M2}
GLUCOSE SERPL-MCNC: 89 MG/DL (ref 70–99)
HCT VFR BLD AUTO: 27.5 % (ref 40.5–52.5)
HGB BLD-MCNC: 9.4 G/DL (ref 13.5–17.5)
LYMPHOCYTES # BLD: 0.7 K/UL (ref 1–5.1)
LYMPHOCYTES NFR BLD: 17.5 %
MCH RBC QN AUTO: 47.6 PG (ref 26–34)
MCHC RBC AUTO-ENTMCNC: 34.3 G/DL (ref 31–36)
MCV RBC AUTO: 138.9 FL (ref 80–100)
MITOCHONDRIA M2 AB SER IA-ACNC: 1 U/ML (ref 0–4)
MONOCYTES # BLD: 0.4 K/UL (ref 0–1.3)
MONOCYTES NFR BLD: 10.6 %
NEUTROPHILS # BLD: 2.8 K/UL (ref 1.7–7.7)
NEUTROPHILS NFR BLD: 69.9 %
PATH INTERP BLD-IMP: NO
PLATELET # BLD AUTO: 169 K/UL (ref 135–450)
PMV BLD AUTO: 8.3 FL (ref 5–10.5)
POTASSIUM SERPL-SCNC: 4.8 MMOL/L (ref 3.5–5.1)
RBC # BLD AUTO: 1.98 M/UL (ref 4.2–5.9)
SODIUM SERPL-SCNC: 136 MMOL/L (ref 136–145)
WBC # BLD AUTO: 4 K/UL (ref 4–11)

## 2024-07-26 PROCEDURE — 82550 ASSAY OF CK (CPK): CPT

## 2024-07-26 PROCEDURE — 97530 THERAPEUTIC ACTIVITIES: CPT

## 2024-07-26 PROCEDURE — 85025 COMPLETE CBC W/AUTO DIFF WBC: CPT

## 2024-07-26 PROCEDURE — 36415 COLL VENOUS BLD VENIPUNCTURE: CPT

## 2024-07-26 PROCEDURE — 2580000003 HC RX 258: Performed by: INTERNAL MEDICINE

## 2024-07-26 PROCEDURE — 97535 SELF CARE MNGMENT TRAINING: CPT

## 2024-07-26 PROCEDURE — 6360000002 HC RX W HCPCS: Performed by: INTERNAL MEDICINE

## 2024-07-26 PROCEDURE — 80048 BASIC METABOLIC PNL TOTAL CA: CPT

## 2024-07-26 PROCEDURE — 6370000000 HC RX 637 (ALT 250 FOR IP): Performed by: INTERNAL MEDICINE

## 2024-07-26 RX ORDER — FUROSEMIDE 10 MG/ML
40 INJECTION INTRAMUSCULAR; INTRAVENOUS ONCE
Status: COMPLETED | OUTPATIENT
Start: 2024-07-26 | End: 2024-07-26

## 2024-07-26 RX ADMIN — TRAMADOL HYDROCHLORIDE 50 MG: 50 TABLET ORAL at 14:13

## 2024-07-26 RX ADMIN — SODIUM CHLORIDE, PRESERVATIVE FREE 10 ML: 5 INJECTION INTRAVENOUS at 10:27

## 2024-07-26 RX ADMIN — LISINOPRIL 2.5 MG: 5 TABLET ORAL at 10:22

## 2024-07-26 RX ADMIN — METOPROLOL TARTRATE 50 MG: 50 TABLET, FILM COATED ORAL at 10:22

## 2024-07-26 RX ADMIN — WATER 1000 MG: 1 INJECTION INTRAMUSCULAR; INTRAVENOUS; SUBCUTANEOUS at 17:37

## 2024-07-26 RX ADMIN — FUROSEMIDE 40 MG: 10 INJECTION, SOLUTION INTRAMUSCULAR; INTRAVENOUS at 10:25

## 2024-07-26 RX ADMIN — ENOXAPARIN SODIUM 40 MG: 100 INJECTION SUBCUTANEOUS at 10:25

## 2024-07-26 RX ADMIN — AMLODIPINE BESYLATE 5 MG: 5 TABLET ORAL at 10:22

## 2024-07-26 RX ADMIN — ASPIRIN 81 MG: 81 TABLET, COATED ORAL at 10:21

## 2024-07-26 RX ADMIN — Medication 100 MG: at 10:22

## 2024-07-26 RX ADMIN — FENOFIBRATE 160 MG: 160 TABLET ORAL at 10:22

## 2024-07-26 RX ADMIN — SODIUM CHLORIDE, POTASSIUM CHLORIDE, SODIUM LACTATE AND CALCIUM CHLORIDE: 600; 310; 30; 20 INJECTION, SOLUTION INTRAVENOUS at 05:35

## 2024-07-26 ASSESSMENT — PAIN SCALES - WONG BAKER
WONGBAKER_NUMERICALRESPONSE: NO HURT
WONGBAKER_NUMERICALRESPONSE: NO HURT

## 2024-07-26 ASSESSMENT — PAIN DESCRIPTION - DESCRIPTORS: DESCRIPTORS: ACHING;THROBBING

## 2024-07-26 ASSESSMENT — PAIN DESCRIPTION - ORIENTATION: ORIENTATION: RIGHT;LEFT

## 2024-07-26 ASSESSMENT — PAIN SCALES - GENERAL
PAINLEVEL_OUTOF10: 7
PAINLEVEL_OUTOF10: 3

## 2024-07-26 ASSESSMENT — PAIN DESCRIPTION - LOCATION: LOCATION: HEAD;LEG;BACK

## 2024-07-26 NOTE — DISCHARGE SUMMARY
Hospital Medicine Discharge Summary    Patient: Kevin Brown     Gender: male  : 1955   Age: 68 y.o.  MRN: [id number]    Admitting Physician: Aide Montoya MD  Discharge Physician: Aide Montoya MD     Code Status: Full Code     Admit Date: 2024   Discharge Date:   2024    Disposition:  Home  Time spent arranging discharge: 31 minutes    Discharge Diagnoses:    Active Hospital Problems    Diagnosis Date Noted    Moderate malnutrition (HCC) [E44.0] 2024    Generalized weakness [R53.1] 2024    Fall [W19.XXXA] 2024       Condition at Discharge:  Stable    Hospital Course:   Admitted to hospital with alcohol abuse and weakness treated with IV fluids CIWA protocol and thiamine had pneumonia treated with IV Rocephin had elevated CK trended down to less than 500 patient have elevated LFTs which are improving did have elevated ferritin and iron sat GI send up hemochromatosis workup with follow-up with GI patient was discharged to SNF for further therapy will need complete alcohol cessation    Discharge Exam:    /72   Pulse (!) 116   Temp 97.6 °F (36.4 °C) (Oral)   Resp 18   Ht 1.803 m (5' 11\")   Wt 89.6 kg (197 lb 8.5 oz)   SpO2 99%   BMI 27.55 kg/m²   General appearance:  Appears comfortable. AAOx3  HEENT: atraumatic, Pupils equal, muscous membranes moist, no masses appreciated  Cardiovascular: Regular rate and rhythm no murmurs appreciated  Respiratory: CTAB no wheezing  Gastrointestinal: Abdomen soft, non-tender, BS+  EXT: no edema  Neurology: no gross focal deficts  Psychiatry: Appropriate affect. Not agitated  Skin: Warm, dry, no rashes appreciated    Discharge Medications:   Current Discharge Medication List        START taking these medications    Details   thiamine mononitrate (THIAMINE) 100 MG tablet Take 1 tablet by mouth daily  Qty: 30 tablet, Refills: 1           Current Discharge Medication List        Current Discharge Medication List     ORDERING SYSTEM PROVIDED HISTORY: Abnormal labs, unexplained weight loss TECHNOLOGIST PROVIDED HISTORY: Reason for exam:->Abnormal labs, unexplained weight loss Additional Contrast?->Oral Decision Support Exception - unselect if not a suspected or confirmed emergency medical condition->Emergency Medical Condition (MA) Reason for Exam: Abnormal labs, unexplained weight loss FINDINGS: Lower Chest: Bibasilar airspace opacities.  No pleural effusion. Organs: Hepatic steatosis with sparing of the posterior right lobe of the liver.  No suspicious hepatic lesions or intrahepatic biliary ductal dilatation.  No gallstones or surrounding gallbladder inflammatory change. The spleen and pancreas are normal.  Adrenal glands are normal.  Kidneys are normal. GI/Bowel: Distal esophagus and stomach are normal.  Small bowel loops show no evidence of obstruction or inflammation.  The appendix is not seen.  However, no suspicious findings are seen in its expected location to indicate acute appendicitis. Pelvis: Urinary bladder and prostate are normal.  No free fluid. Peritoneum/Retroperitoneum: Aorta is normal caliber.  No lymphadenopathy. Bones/Soft Tissues: No suspicious osseous lesion     1.  No acute abdominal or pelvic findings. 2.  Bibasilar airspace opacities.  This could represent atelectasis versus developing pneumonia.  Correlate clinically. 3.  Hepatic steatosis. 4.  No gallstones are visible.  No surrounding gallbladder inflammatory change.     US ABDOMEN LIMITED Specify organ? LIVER, GALLBLADDER, PANCREAS    Result Date: 7/22/2024  EXAMINATION: RIGHT UPPER QUADRANT ULTRASOUND 7/22/2024 8:45 am COMPARISON: None. HISTORY: ORDERING SYSTEM PROVIDED HISTORY: elevated bilirubin TECHNOLOGIST PROVIDED HISTORY: Reason for exam:->elevated bilirubin Specify organ?->LIVER Specify organ?->GALLBLADDER Specify organ?->PANCREAS Reason for Exam: abnormal lfts FINDINGS: LIVER:  The liver is diffusely echogenic in appearance, suggestive of

## 2024-07-26 NOTE — PROGRESS NOTES
[facility] - Inpatient Rehabilitation Department   Phone: [phone]    Physical Therapy    [] Initial Evaluation            [x] Daily Treatment Note         [] Discharge Summary      Patient: Kevin Brown   : 1955   MRN: [id number]   Date of Service:  2024  Admitting Diagnosis: Generalized weakness  Current Admission Summary: This is a 68 y.o. male who was brought in by EMS transportation for generalized weakness and a fall at home.  Patient has had progressively worsening generalized weakness, particularly weakness in his bilateral lower extremities, developing over the last several weeks.  Per his wife, he has had multiple falls where his legs simply become too weak to support him and he fell to the ground.  This morning, this occurred while using his cane, resulting in a slow fall to the car in his house.  He did not hit his head and did not lose consciousness.  He is complaining of pain in his neck and back which he believes is as a result of the fall.  He denies any pain or injury to his arms or legs.  He denies having had associated chest pain, shortness of breath, palpitations, lightheadedness, or dizziness prior to the fall.  He has no focal weakness.  His wife states in the past when he felt this way he was found to be anemic and required blood.  She also reports cardiac rehabilitation and PT/OT was recommended on the previous hospital stay, but patient refused it.  He is not currently undergoing any PT OT.   Past Medical History:  has a past medical history of CAD (coronary artery disease), Equilibrium disorder, Hyperlipidemia, Hypertension, and Old MI (myocardial infarction).  Past Surgical History:  has a past surgical history that includes Coronary angioplasty with stent; Cardiac catheterization; Upper gastrointestinal endoscopy (N/A, 2023); and Colonoscopy (N/A, 2023).  Discharge Recommendations: Kevin Brown scored a 9/24 on the AM-PAC short mobility form.  Score : 9              Cognition  WFL  Orientation:    alert and oriented x 4  Command Following:   WFL    Education  Barriers To Learning: hearing  Patient Education: patient educated on goals, PT role and benefits, plan of care, discharge recommendations  Learning Assessment:  patient verbalizes understanding, would benefit from continued reinforcement    Assessment  Activity Tolerance: limited by LE pain and weakness  Impairments Requiring Therapeutic Intervention: decreased functional mobility, decreased strength, decreased balance  Prognosis: good  Clinical Assessment: Pt presenting below his functional mobility baseline due to BLE weakness and pain. Pt progressed to completing transfer OOB with use of RW this date, though continues to require assistance of 2 skilled therapists. Continued skilled PT to promote further progression of function towards pt's baseline.   Safety Interventions: patient left in bed, bed alarm in place, call light within reach, and nurse notified    Plan  Frequency: 3-5 x/per week  Current Treatment Recommendations: strengthening, balance training, functional mobility training, transfer training, gait training, stair training, and endurance training    Goals  Patient Goals: to regain strength    Short Term Goals:  Time Frame: by dc  Patient will complete bed mobility at contact guard assistance   Patient will complete transfers at minimal assistance   Patient will ambulate 25 ft with use of rolling walker at moderate assistance    Above goals reviewed on 7/26/2024.  All goals are ongoing at this time unless indicated above.      Therapy Session Time      Individual Group Co-treatment   Time In      0953   Time Out      1024   Minutes      31     Second Session Therapy Time:   Individual Concurrent Group Co-treatment   Time In        1104   Time Out        1118   Minutes        14     Timed Code Treatment Minutes:  45    Total Treatment Minutes:  45    Electronically Signed By: Last

## 2024-07-26 NOTE — CARE COORDINATION
07/26/24 1427   IMM Letter   IMM Letter given to Patient/Family/Significant other/Guardian/POA/by: IMM given by CM   IMM Letter date given: 07/26/24   IMM Letter time given: 1425       
Case Management -  Discharge Note      Patient Name: Kevin Brown                   YOB: 1955            Readmission Risk (Low < 19, Mod (19-27), High > 27): Readmission Risk Score: 13.9    Current PCP: Derek Griffith MD    (Formerly [facility]) Important Message from Medicare:    Date: 7/26/24    PT AM-PAC: 9 /24  OT AM-PAC: 16 /24    Patient/patient representative has been educated on the benefits of a  skilled nursing facility as well as the possible risks of declining recommended services. Patient/patient representative has acknowledged the information provided and decided on the following discharge plan. Patient/ patient representative has been provided freedom of choice regarding service provider, supported by basic dialogue that supports the patient's individualized plan of care/goals.    Patient noted to have a discharge order.  Pt has been medically cleared for transition to Skilled Nursing Rehab Facility    Patient discharged to   [address] [place] [address]  Report: [phone]  Fax: [phone]         HENS Completed:  yes  Pre-cert required/obtained:  yes    Transportation scheduled for 7/26/24 at 5:00 pm  Transportation provided by [facility]  AVS faxed and agency notified:  yes  The following prescriptions sent with pt: n/a  Family Notified:  yes    Nurse to call report to facility    Financial    Payor: [facility] MEDICARE / Plan: ANTHEM MEDIBLUE ESSENTIAL/PLUS / Product Type: *No Product type* /     Pharmacy:  Potential assistance Purchasing Medications:    Meds-to-Beds request: Yes      [id number] - [place], [place] - [address] [place] [address] [phone] -  [phone]  [address] [place] [address]  Phone: [phone] Fax: [phone]      Notes:    Additional Case Management Notes: n/a    Electronically signed by THANH Hernandez on 7/26/2024 at 4:32 PM         
Discharge Planning Note Re: Skilled Nursing     CM/SW noted consult for discharge planning. Chart reviewed. Noted recommendations for SNF.  CM met with patient and wife. Introduced self and explained role of CM and discharge planning.    Patient is agreeable to SNF on dc.     [place] of choice list was provided with basic dialogue that supports the patient's individualized plan of care/goals, treatment preferences and shares the quality data associated with the providers. [x] Yes [] No.  Patient and wife have reviewed the provided list and made selections.    Referral made to the following facilities through [facility]:    [address] [address]  Phone: [phone]  Fax: [phone]    [address] [place] [address]  Report: [phone]  Fax: [phone]    [facility]  [address] [place] [address]  Phone: [phone]  Fax: [phone]       CM/SW will follow-up on referrals and provide any additional documentation necessary to facilitate placement.     Electronically signed by THANH Hernandez on 7/25/2024 at 3:38 PM     
Discharge Planning:     (CM) called Liset, liaison for all three facilities patient chose:  [facility], [facility], and [facility].  CM left a detailed VM with call back information.    CM team following.    Electronically signed by THANH Hernandez on 7/26/2024 at 8:42 AM         
Discharge Planning:     (CM) received a call from Liset with [facility].  They have accepted patient. Patient was also informed that he needs to bring his own fenofibrate. Patient and wife agreed to this.  Liset from [facility] started the pre-cert as it is a [facility] Access plan.  Liset will let this CM know th pending ID number.    CM team following.    Electronically signed by THANH Hernandez on 7/26/2024 at 1:17 PM     UPDATE:    Pre-cert was approved within the hour.  Patient discharging today to [facility].    Electronically signed by THANH Hernandez on 7/26/2024 at 2:19 PM   
Department  Ph: [phone]

## 2024-07-26 NOTE — PROGRESS NOTES
[facility] - Inpatient Rehabilitation Department   Phone: [phone]    Occupational Therapy    [] Initial Evaluation            [x] Daily Treatment Note         [] Discharge Summary      Patient: Kevin Brown   : 1955   MRN: [id number]   Date of Service:  2024    Admitting Diagnosis:  Generalized weakness  Current Admission Summary: This is a 68 y.o. male who was brought in by EMS transportation for generalized weakness and a fall at home.  Patient has had progressively worsening generalized weakness, particularly weakness in his bilateral lower extremities, developing over the last several weeks.  Per his wife, he has had multiple falls where his legs simply become too weak to support him and he fell to the ground.  This morning, this occurred while using his cane, resulting in a slow fall to the car in his house.  He did not hit his head and did not lose consciousness.  He is complaining of pain in his neck and back which he believes is as a result of the fall.  He denies any pain or injury to his arms or legs.  He denies having had associated chest pain, shortness of breath, palpitations, lightheadedness, or dizziness prior to the fall.  He has no focal weakness.  His wife states in the past when he felt this way he was found to be anemic and required blood.  She also reports cardiac rehabilitation and PT/OT was recommended on the previous hospital stay, but patient refused it.  He is not currently undergoing any PT OT.   Past Medical History:  has a past medical history of CAD (coronary artery disease), Equilibrium disorder, Hyperlipidemia, Hypertension, and Old MI (myocardial infarction).  Past Surgical History:  has a past surgical history that includes Coronary angioplasty with stent; Cardiac catheterization; Upper gastrointestinal endoscopy (N/A, 2023); and Colonoscopy (N/A, 2023).    Discharge Recommendations: Kevin Brown scored a 16/24 on the AM-PAC ADL Inpatient

## 2024-07-26 NOTE — PLAN OF CARE
Problem: Safety - Adult  Goal: Free from fall injury  7/22/2024 1815 by Maria De Jesus Charlton, RN  Outcome: Progressing  7/22/2024 1526 by Maria De Jesus Charlton, RN  Outcome: Progressing     Problem: Nutrition Deficit:  Goal: Optimize nutritional status  Outcome: Progressing     
  Problem: Safety - Adult  Goal: Free from fall injury  7/23/2024 0011 by Purvi Sherwood, RN  Outcome: Progressing  7/22/2024 1815 by Maria De Jesus Charlton, RN  Outcome: Progressing  7/22/2024 1526 by Maria De Jesus Charlton, RN  Outcome: Progressing     
  Problem: Safety - Adult  Goal: Free from fall injury  7/24/2024 1655 by Cheri Vee RN  Outcome: Progressing  7/24/2024 0334 by Veronica Daily RN  Outcome: Progressing     Problem: Skin/Tissue Integrity  Goal: Absence of new skin breakdown  Description: 1.  Monitor for areas of redness and/or skin breakdown  2.  Assess vascular access sites hourly  3.  Every 4-6 hours minimum:  Change oxygen saturation probe site  4.  Every 4-6 hours:  If on nasal continuous positive airway pressure, respiratory therapy assess nares and determine need for appliance change or resting period.  7/24/2024 1655 by Cheri Vee RN  Outcome: Progressing  7/24/2024 0334 by Veronica Daily RN  Outcome: Progressing     Problem: Nutrition Deficit:  Goal: Optimize nutritional status  7/24/2024 1655 by Cheri Vee RN  Outcome: Progressing  7/24/2024 0334 by Veronica Daily RN  Outcome: Progressing     Problem: Pain  Goal: Verbalizes/displays adequate comfort level or baseline comfort level  7/24/2024 1655 by Cheri Vee RN  Outcome: Progressing  7/24/2024 0334 by Veronica Daily RN  Outcome: Progressing     Problem: Skin/Tissue Integrity - Adult  Goal: Incisions, wounds, or drain sites healing without S/S of infection  Outcome: Progressing     Problem: Musculoskeletal - Adult  Goal: Return mobility to safest level of function  Outcome: Progressing  Goal: Return ADL status to a safe level of function  Outcome: Progressing     Problem: Gastrointestinal - Adult  Goal: Maintains or returns to baseline bowel function  Outcome: Progressing  Goal: Maintains adequate nutritional intake  Outcome: Progressing     Problem: Genitourinary - Adult  Goal: Absence of urinary retention  Outcome: Progressing     
  Problem: Safety - Adult  Goal: Free from fall injury  7/24/2024 2246 by Adenike Hollis RN  Outcome: Progressing  7/24/2024 1655 by Cheri Vee RN  Outcome: Progressing     Problem: Skin/Tissue Integrity  Goal: Absence of new skin breakdown  Description: 1.  Monitor for areas of redness and/or skin breakdown  2.  Assess vascular access sites hourly  3.  Every 4-6 hours minimum:  Change oxygen saturation probe site  4.  Every 4-6 hours:  If on nasal continuous positive airway pressure, respiratory therapy assess nares and determine need for appliance change or resting period.  7/24/2024 2246 by Adenike Hollis RN  Outcome: Progressing  7/24/2024 1655 by Cheri Vee RN  Outcome: Progressing     Problem: Nutrition Deficit:  Goal: Optimize nutritional status  7/24/2024 2246 by Adenike Hollis RN  Outcome: Progressing  7/24/2024 1655 by Cheri Vee RN  Outcome: Progressing     Problem: Pain  Goal: Verbalizes/displays adequate comfort level or baseline comfort level  7/24/2024 2246 by Adenike Hollis RN  Outcome: Progressing  7/24/2024 1655 by Cheri Vee RN  Outcome: Progressing     Problem: Skin/Tissue Integrity - Adult  Goal: Incisions, wounds, or drain sites healing without S/S of infection  7/24/2024 2246 by Adenike Hollis RN  Outcome: Progressing  7/24/2024 1655 by Cheri Vee RN  Outcome: Progressing     Problem: Musculoskeletal - Adult  Goal: Return mobility to safest level of function  7/24/2024 2246 by Adenike Hollis RN  Outcome: Progressing  7/24/2024 1655 by Cheri Vee RN  Outcome: Progressing  Goal: Return ADL status to a safe level of function  7/24/2024 2246 by Adenike Hollis RN  Outcome: Progressing  7/24/2024 1655 by Cheri Vee RN  Outcome: Progressing     Problem: Gastrointestinal - Adult  Goal: Maintains or returns to baseline bowel function  7/24/2024 2246 by Adenike Hollis RN  Outcome: Progressing  7/24/2024 1655 by Cheri Vee RN  Outcome: 
  Problem: Safety - Adult  Goal: Free from fall injury  7/25/2024 1138 by Elinor Corral RN  Outcome: Progressing  7/24/2024 2246 by Adenike Hollis RN  Outcome: Progressing     Problem: Skin/Tissue Integrity  Goal: Absence of new skin breakdown  Description: 1.  Monitor for areas of redness and/or skin breakdown  2.  Assess vascular access sites hourly  3.  Every 4-6 hours minimum:  Change oxygen saturation probe site  4.  Every 4-6 hours:  If on nasal continuous positive airway pressure, respiratory therapy assess nares and determine need for appliance change or resting period.  7/25/2024 1138 by Elinor Corral RN  Outcome: Progressing  7/24/2024 2246 by Adenike Hollis RN  Outcome: Progressing     Problem: Nutrition Deficit:  Goal: Optimize nutritional status  7/25/2024 1138 by Elinor Corral RN  Outcome: Progressing  Flowsheets (Taken 7/25/2024 0970 by Deepali Santiago, MS, RD, LD)  Nutrient intake appropriate for improving, restoring, or maintaining nutritional needs:   Recommend appropriate diets, oral nutritional supplements, and vitamin/mineral supplements   Monitor oral intake, labs, and treatment plans  7/24/2024 2246 by Adenike Hollis RN  Outcome: Progressing     Problem: Pain  Goal: Verbalizes/displays adequate comfort level or baseline comfort level  7/25/2024 1138 by Elinor Corral RN  Outcome: Progressing  7/24/2024 2246 by Adenike Hollis RN  Outcome: Progressing     Problem: Skin/Tissue Integrity - Adult  Goal: Incisions, wounds, or drain sites healing without S/S of infection  7/25/2024 1138 by Elinor Corral RN  Outcome: Progressing  7/24/2024 2246 by Adenike Hollis RN  Outcome: Progressing     Problem: Musculoskeletal - Adult  Goal: Return mobility to safest level of function  7/25/2024 1138 by Elinor Corral RN  Outcome: Progressing  7/24/2024 2246 by Adenike Hollis RN  Outcome: Progressing  Goal: Return ADL status to a safe level of function  7/25/2024 1138 by Elinor Corral RN  Outcome: Progressing  7/24/2024 
  Problem: Safety - Adult  Goal: Free from fall injury  Outcome: Progressing     
  Problem: Safety - Adult  Goal: Free from fall injury  Outcome: Progressing     Problem: Skin/Tissue Integrity  Goal: Absence of new skin breakdown  Description: 1.  Monitor for areas of redness and/or skin breakdown  2.  Assess vascular access sites hourly  3.  Every 4-6 hours minimum:  Change oxygen saturation probe site  4.  Every 4-6 hours:  If on nasal continuous positive airway pressure, respiratory therapy assess nares and determine need for appliance change or resting period.  Outcome: Progressing     Problem: Nutrition Deficit:  Goal: Optimize nutritional status  Outcome: Progressing     Problem: Pain  Goal: Verbalizes/displays adequate comfort level or baseline comfort level  Outcome: Progressing     
  Problem: Safety - Adult  Goal: Free from fall injury  Outcome: Progressing     Problem: Skin/Tissue Integrity  Goal: Absence of new skin breakdown  Description: 1.  Monitor for areas of redness and/or skin breakdown  2.  Assess vascular access sites hourly  3.  Every 4-6 hours minimum:  Change oxygen saturation probe site  4.  Every 4-6 hours:  If on nasal continuous positive airway pressure, respiratory therapy assess nares and determine need for appliance change or resting period.  Outcome: Progressing     Problem: Nutrition Deficit:  Goal: Optimize nutritional status  Outcome: Progressing     Problem: Pain  Goal: Verbalizes/displays adequate comfort level or baseline comfort level  Outcome: Progressing     Problem: Skin/Tissue Integrity - Adult  Goal: Incisions, wounds, or drain sites healing without S/S of infection  Outcome: Progressing     Problem: Musculoskeletal - Adult  Goal: Return mobility to safest level of function  Outcome: Progressing  Goal: Return ADL status to a safe level of function  Outcome: Progressing     Problem: Gastrointestinal - Adult  Goal: Maintains or returns to baseline bowel function  Outcome: Progressing  Goal: Maintains adequate nutritional intake  Outcome: Progressing     Problem: Genitourinary - Adult  Goal: Absence of urinary retention  Outcome: Progressing     
Discharge  7/26/2024 0400 by Adenike Hollis RN  Outcome: Progressing  Goal: Maintains adequate nutritional intake  7/26/2024 1559 by Elinor Corral RN  Outcome: Adequate for Discharge  7/26/2024 0400 by Adenike Hollis RN  Outcome: Progressing     Problem: Genitourinary - Adult  Goal: Absence of urinary retention  7/26/2024 1559 by Elinor Corral, RN  Outcome: Adequate for Discharge  7/26/2024 0400 by Adenike Hollis, RN  Outcome: Progressing

## 2024-07-26 NOTE — PROGRESS NOTES
Patient discharging to \[facility]\"" today. EMS here to transport. IV access removed without complication and dry dressing applied. Patient tolerated well. Report given to Mady from EMS and called to Roddy at accepting facility

## 2024-07-26 NOTE — PROGRESS NOTES
Shift assessment completed. Routine vitals obtained and stable. Scheduled medications given. Patient is awake, alert and oriented. Respirations are easy and unlabored. Patient does not appear to be in distress, resting comfortably in recliner at this time. Call light within reach.

## 2024-07-27 LAB
HFE GENE MUT ANL BLD/T: NORMAL
HFE P.C282Y BLD/T QL: NORMAL
HFE P.H63D BLD/T QL: NORMAL
HFE P.S65C BLD/T QL: NEGATIVE
SPECIMEN SOURCE: NORMAL

## 2024-07-27 NOTE — PROGRESS NOTES
Physician Progress Note      PATIENT:               YFN CA  CSN #:                  [id number]  :                       1955  ADMIT DATE:       2024 5:57 AM  DISCH DATE:        2024 5:45 PM  RESPONDING  PROVIDER #:        Aide Granados MD          QUERY TEXT:    Pt admitted with weakness and fall.  Sepsis was note in the ED and then   dropped.  CT noted possible pneumonia and patient treated with Rocephin.  Wbc   4.6 on admit then down to 2.5, tachycardia with lactic-3.6 and pct-15.51.    After study, please clarify the following:    The medical record reflects the following:  Risk Factors: age, pneumonia, etoh abuse  Clinical Indicators: pancytopenia, Sepsis was note in the ED and then dropped.    CT noted possible pneumonia and patient treated with Rocephin.  Wbc 4.6 on   admit then down to 2.5, tachycardia with lactic-3.6 and pct-15.51  Treatment: lactic, pct, blood cultures, IV Rocephin, supportive care, IVF    Thank you,  Amna Sr RN,BSN,CCDS,CRCR  Options provided:  -- Sepsis due to pneumonia confirmed present on admission  -- Sepsis ruled out  -- Other - I will add my own diagnosis  -- Disagree - Not applicable / Not valid  -- Disagree - Clinically unable to determine / Unknown  -- Refer to Clinical Documentation Reviewer    PROVIDER RESPONSE TEXT:    The diagnosis of sepsis was ruled out.    Query created by: Amna Sr on 2024 1:16 PM      QUERY TEXT:    Patient with elevated CK of 488 then up to 1783.  CK trended and given IVF. If   possible, please document in progress notes and discharge summary if you are   evaluating and/or treating any of the following:    The medical record reflects the following:  Risk Factors: advanced age, stain drug, etoh abuse, falls  Clinical Indicators:  CK of 488 then up to 1783  Treatment: IVF, trend CK, statin on hold    Thank you,  Amna Sr RN,BSN,CCDS,CRCR  Options provided:  -- Non-traumatic rhabdomyolysis  --  Traumatic rhabdomyolysis  -- CK not clinically significant  -- Other - I will add my own diagnosis  -- Disagree - Not applicable / Not valid  -- Disagree - Clinically unable to determine / Unknown  -- Refer to Clinical Documentation Reviewer    PROVIDER RESPONSE TEXT:    Non-traumatic rhabdomyolysis    Query created by: Amna Sr on 7/25/2024 1:21 PM      Electronically signed by:  Aide Granados MD 7/26/2024 10:19 PM
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
## 2024-10-07 ENCOUNTER — OFFICE VISIT (OUTPATIENT)
Dept: CARDIOLOGY CLINIC | Age: 69
End: 2024-10-07
Payer: MEDICARE

## 2024-10-07 VITALS
SYSTOLIC BLOOD PRESSURE: 110 MMHG | HEART RATE: 91 BPM | WEIGHT: 172 LBS | HEIGHT: 71 IN | BODY MASS INDEX: 24.08 KG/M2 | DIASTOLIC BLOOD PRESSURE: 60 MMHG | OXYGEN SATURATION: 96 %

## 2024-10-07 DIAGNOSIS — I10 PRIMARY HYPERTENSION: Chronic | ICD-10-CM

## 2024-10-07 DIAGNOSIS — I25.10 CORONARY ARTERY DISEASE INVOLVING NATIVE CORONARY ARTERY OF NATIVE HEART WITHOUT ANGINA PECTORIS: Primary | Chronic | ICD-10-CM

## 2024-10-07 DIAGNOSIS — D62 ACUTE BLOOD LOSS ANEMIA: ICD-10-CM

## 2024-10-07 DIAGNOSIS — E78.2 MIXED HYPERLIPIDEMIA: Chronic | ICD-10-CM

## 2024-10-07 PROCEDURE — 3078F DIAST BP <80 MM HG: CPT | Performed by: INTERNAL MEDICINE

## 2024-10-07 PROCEDURE — 1123F ACP DISCUSS/DSCN MKR DOCD: CPT | Performed by: INTERNAL MEDICINE

## 2024-10-07 PROCEDURE — 3074F SYST BP LT 130 MM HG: CPT | Performed by: INTERNAL MEDICINE

## 2024-10-07 PROCEDURE — 99214 OFFICE O/P EST MOD 30 MIN: CPT | Performed by: INTERNAL MEDICINE

## 2024-10-07 RX ORDER — GABAPENTIN 100 MG/1
200 CAPSULE ORAL 3 TIMES DAILY
COMMUNITY
Start: 2024-09-24 | End: 2024-12-23

## 2024-10-07 RX ORDER — ROSUVASTATIN CALCIUM 20 MG/1
20 TABLET, COATED ORAL DAILY
Qty: 90 TABLET | Refills: 4 | Status: SHIPPED | OUTPATIENT
Start: 2024-10-07

## 2024-10-07 NOTE — PROGRESS NOTES
regurgitation. The estimated RVSP is 23 mmHg.    Image quality is adequate. Contrast used: Definity.      Assessment:   1. CAD:   Stable without angina  Inferior MI 6/2023 [facility]=>Stenting of RCA and PTCA of LAD in-stent restenosis.   Doing well. Continue ASA    2003 cath> s/p Cypher stent to mid LAD.    2004 cath due to positive stress test> patent stent and no other disease noted.   6/2011 Myoview GXT showed normal perfusion with EF 71%.   2. Hyperlipidemia: LDL= 79, change pravastatin to rosuvastatin 20 mg.  Goal LDL is less than 70 repeat lipids in 3 months for   3. Hypertension: Blood pressure 110/60, pulse 91, height 1.803 m (5' 10.98\"), weight 78 kg (172 lb), SpO2 96%. Controlled. Continue lopressor and lisinopril   4. Anemia: Resolved with alcohol cessation.  Now seeing GI for hemochromatosis    Plan:  Stable cardiac status  Change pravastatin to rosuvastatin  Labs in 3 months  Follow-up in 6 months    Thank you for allowing me to participate in the care of this individual.    Fadi Hickman M.D., [facility]

## 2024-10-07 NOTE — PATIENT INSTRUCTIONS
Stop Pravastatin    Start Rosuvastatin (Crestor) 20 mg daily     Labs in 3 months - Lipid Panel, BMP, ALT, AST - FASTING blood work - Call office if you have not received result follow up call a couple of days after having labs drawn    Follow up with Dr. Hickman 6 months

## 2025-02-27 ENCOUNTER — TELEPHONE (OUTPATIENT)
Dept: CARDIOLOGY CLINIC | Age: 70
End: 2025-02-27

## 2025-02-27 DIAGNOSIS — E78.2 MIXED HYPERLIPIDEMIA: Chronic | ICD-10-CM

## 2025-02-27 DIAGNOSIS — I10 ESSENTIAL HYPERTENSION: Chronic | ICD-10-CM

## 2025-02-27 RX ORDER — METOPROLOL TARTRATE 50 MG
TABLET ORAL
Qty: 180 TABLET | Refills: 4 | Status: CANCELLED | OUTPATIENT
Start: 2025-02-27

## 2025-02-27 RX ORDER — METOPROLOL TARTRATE 50 MG
TABLET ORAL
Qty: 180 TABLET | Refills: 4 | Status: SHIPPED | OUTPATIENT
Start: 2025-02-27

## 2025-02-27 NOTE — TELEPHONE ENCOUNTER
Medication Refill    Medication needing refilled: metoprolol tartrate (LOPRESSOR) 50 MG     Dosage of the medication:    How are you taking this medication (QD, BID, TID, QID, PRN):metoprolol tartrate (LOPRESSOR) 50 MG     30 or 90 day supply called in: 90 day supply    When will you run out of your medication:    Which Pharmacy are we sending the medication to?: [id number] - [place], [place] - [facility] [phone] [facility] [phone]

## 2025-04-01 ENCOUNTER — HOSPITAL ENCOUNTER (OUTPATIENT)
Age: 70
Discharge: HOME | End: 2025-04-01
Payer: MEDICARE

## 2025-04-01 DIAGNOSIS — E78.2 MIXED HYPERLIPIDEMIA: Chronic | ICD-10-CM

## 2025-04-01 DIAGNOSIS — I10 PRIMARY HYPERTENSION: Chronic | ICD-10-CM

## 2025-04-01 LAB
ALT SERPL-CCNC: 37 U/L (ref 10–40)
ANION GAP SERPL CALCULATED.3IONS-SCNC: 13 MMOL/L (ref 3–16)
AST SERPL-CCNC: 36 U/L (ref 15–37)
BUN SERPL-MCNC: 16 MG/DL (ref 7–20)
CALCIUM SERPL-MCNC: 10.2 MG/DL (ref 8.3–10.6)
CHLORIDE SERPL-SCNC: 100 MMOL/L (ref 99–110)
CHOLEST SERPL-MCNC: 202 MG/DL (ref 0–199)
CO2 SERPL-SCNC: 27 MMOL/L (ref 21–32)
CREAT SERPL-MCNC: 0.6 MG/DL (ref 0.8–1.3)
GFR SERPLBLD CREATININE-BSD FMLA CKD-EPI: >90 ML/MIN/{1.73_M2}
GLUCOSE SERPL-MCNC: 153 MG/DL (ref 70–99)
HDLC SERPL-MCNC: 45 MG/DL (ref 40–60)
LDLC SERPL CALC-MCNC: ABNORMAL MG/DL
LDLC SERPL-MCNC: 88 MG/DL
POTASSIUM SERPL-SCNC: 4.5 MMOL/L (ref 3.5–5.1)
SODIUM SERPL-SCNC: 140 MMOL/L (ref 136–145)
TRIGL SERPL-MCNC: 444 MG/DL (ref 0–150)
VLDLC SERPL CALC-MCNC: ABNORMAL MG/DL

## 2025-04-01 PROCEDURE — 84450 TRANSFERASE (AST) (SGOT): CPT

## 2025-04-01 PROCEDURE — 83721 ASSAY OF BLOOD LIPOPROTEIN: CPT

## 2025-04-01 PROCEDURE — 80048 BASIC METABOLIC PNL TOTAL CA: CPT

## 2025-04-01 PROCEDURE — 84460 ALANINE AMINO (ALT) (SGPT): CPT

## 2025-04-01 PROCEDURE — 80061 LIPID PANEL: CPT

## 2025-04-02 ENCOUNTER — RESULTS FOLLOW-UP (OUTPATIENT)
Dept: CARDIOLOGY CLINIC | Age: 70
End: 2025-04-02

## 2025-04-04 NOTE — TELEPHONE ENCOUNTER
----- Message from Dr. Fadi Hickman MD sent at 4/3/2025  4:56 PM EDT -----  Triglycerides are very high.  Limit carbohydrates.  Follow-up as planned.  ELIA

## 2025-04-04 NOTE — TELEPHONE ENCOUNTER
Spoke to patient's wife on HIPAA form. She will pass along info. He has been eating a lot of ice cream but just cut it out of his diet.

## 2025-04-10 ENCOUNTER — OFFICE VISIT (OUTPATIENT)
Dept: CARDIOLOGY CLINIC | Age: 70
End: 2025-04-10
Payer: MEDICARE

## 2025-04-10 VITALS
WEIGHT: 235.5 LBS | HEIGHT: 71 IN | DIASTOLIC BLOOD PRESSURE: 78 MMHG | BODY MASS INDEX: 32.97 KG/M2 | HEART RATE: 77 BPM | OXYGEN SATURATION: 96 % | SYSTOLIC BLOOD PRESSURE: 124 MMHG

## 2025-04-10 DIAGNOSIS — I10 PRIMARY HYPERTENSION: Chronic | ICD-10-CM

## 2025-04-10 DIAGNOSIS — R73.9 BLOOD GLUCOSE ELEVATED: ICD-10-CM

## 2025-04-10 DIAGNOSIS — I25.10 CORONARY ARTERY DISEASE INVOLVING NATIVE CORONARY ARTERY OF NATIVE HEART WITHOUT ANGINA PECTORIS: Primary | Chronic | ICD-10-CM

## 2025-04-10 DIAGNOSIS — D62 ACUTE BLOOD LOSS ANEMIA: ICD-10-CM

## 2025-04-10 DIAGNOSIS — E78.2 MIXED HYPERLIPIDEMIA: Chronic | ICD-10-CM

## 2025-04-10 PROCEDURE — 3074F SYST BP LT 130 MM HG: CPT | Performed by: INTERNAL MEDICINE

## 2025-04-10 PROCEDURE — 1159F MED LIST DOCD IN RCRD: CPT | Performed by: INTERNAL MEDICINE

## 2025-04-10 PROCEDURE — 3078F DIAST BP <80 MM HG: CPT | Performed by: INTERNAL MEDICINE

## 2025-04-10 PROCEDURE — 99214 OFFICE O/P EST MOD 30 MIN: CPT | Performed by: INTERNAL MEDICINE

## 2025-04-10 PROCEDURE — 1123F ACP DISCUSS/DSCN MKR DOCD: CPT | Performed by: INTERNAL MEDICINE

## 2025-04-10 RX ORDER — LISINOPRIL 5 MG/1
5 TABLET ORAL DAILY
COMMUNITY
Start: 2025-04-02

## 2025-04-10 NOTE — PATIENT INSTRUCTIONS
No medication changes today     Labs soon - CBC, BMP, Lipid Panel, Hemoglobin A1c - FASTING blood work     Follow up with Dr. Hickman in 6 months

## 2025-04-10 NOTE — PROGRESS NOTES
[facility]  Cardiac Follow up     Referring Provider:  Derek Griffith MD     Chief Complaint   Patient presents with    6 Month Follow-Up    Coronary Artery Disease    Hyperlipidemia    Hypertension      History of Present Illness:  Mr. Brown is here for a follow-up visit on his history of CAD with MI and PCI in 2004, hypertension, and hyperlipidemia. He has not smoked since his heart attack.    He presented to [facility] June 2023 with acute inferior myocardial infarction.  He underwent stenting of the right coronary artery.  He was found has some restenosis of an LAD stent and that was angioplastied.  He had an echocardiogram showing normal left ventricular function.      He was admitted 7/2024 for weight loss, weakness and chronic alcohol use.  Since that time he has stopped drinking.  His liver test have become normal.  His anemia has resolved.  He has been diagnosed with hemochromatosis.  He follows with GI and is planning on seeing a liver specialist.  He is very weak and has trouble walking still but is improving.  He is getting physical therapy in his house.      He is feeling well. Stronger. Walking some without a walker. Recent labs with increased glucose and trigs. Always normal in past. Unclear if he ate prior. Plan to recheck    Past Medical History: has a past medical history of CAD (coronary artery disease), Equilibrium disorder, Hyperlipidemia, Hypertension, and Old MI (myocardial infarction).  Surgical History: has a past surgical history that includes Coronary angioplasty with stent; Cardiac catheterization; Upper gastrointestinal endoscopy (N/A, 12/18/2023); and Colonoscopy (N/A, 12/19/2023).   Social History: reports that he has quit smoking. His smoking use included cigarettes. He has been exposed to tobacco smoke. He has never used smokeless tobacco. He reports current alcohol use. He reports that he does not use drugs.   Family History:family history includes Heart Disease in his